# Patient Record
Sex: MALE | Race: WHITE | NOT HISPANIC OR LATINO | Employment: FULL TIME | URBAN - METROPOLITAN AREA
[De-identification: names, ages, dates, MRNs, and addresses within clinical notes are randomized per-mention and may not be internally consistent; named-entity substitution may affect disease eponyms.]

---

## 2017-02-27 ENCOUNTER — TELEPHONE (OUTPATIENT)
Dept: FAMILY MEDICINE | Facility: OTHER | Age: 52
End: 2017-02-27

## 2017-02-27 NOTE — TELEPHONE ENCOUNTER
Krishna Diaz is a 51 year old male who presents with sore throat and ear ache.    NURSING ASSESSMENT:  Description:  Sore throat and right ear ache. Night sweats last night and feeling fatigue. Around people at work who have been sick with colds. Stated pain in tolerable but worse when laying down trying to sleep. Denies fevers, chills, fever, body aches.   Onset/duration:  Yesterday, 02/26/2017  Pain scale (0-10)   2-6/10 sharp pain when swallowing   Last exam/Treatment:  06/22/2016  Allergies:   Allergies   Allergen Reactions     No Known Drug Allergies      NURSING PLAN: Nursing advice to patient to be seen within 24 hours    RECOMMENDED DISPOSITION:  See in 24 hours and home care measures - for the sore throat and ear pain   Will comply with recommendation: Yes schedule to see EM 02/28/2017 at 4pm   If further questions/concerns or if symptoms do not improve, worsen or new symptoms develop, call your PCP or [facility] as soon as possible.    NOTES:  Disposition was determined by the first positive assessment question, therefore all previous assessment questions were negative    Guideline used:  Telephone Triage Protocols for Nurses, Fourth Edition, Jill Jc  Nursing Judgment     Maritza Finn RN

## 2017-02-28 ENCOUNTER — OFFICE VISIT (OUTPATIENT)
Dept: FAMILY MEDICINE | Facility: OTHER | Age: 52
End: 2017-02-28
Payer: COMMERCIAL

## 2017-02-28 VITALS
DIASTOLIC BLOOD PRESSURE: 70 MMHG | HEART RATE: 59 BPM | WEIGHT: 255 LBS | RESPIRATION RATE: 18 BRPM | HEIGHT: 68 IN | SYSTOLIC BLOOD PRESSURE: 136 MMHG | TEMPERATURE: 98.8 F | BODY MASS INDEX: 38.65 KG/M2 | OXYGEN SATURATION: 94 %

## 2017-02-28 DIAGNOSIS — R07.0 THROAT PAIN: Primary | ICD-10-CM

## 2017-02-28 DIAGNOSIS — J06.9 VIRAL URI WITH COUGH: ICD-10-CM

## 2017-02-28 DIAGNOSIS — H10.45 CHRONIC ALLERGIC CONJUNCTIVITIS: ICD-10-CM

## 2017-02-28 LAB
DEPRECATED S PYO AG THROAT QL EIA: NORMAL
MICRO REPORT STATUS: NORMAL
SPECIMEN SOURCE: NORMAL

## 2017-02-28 PROCEDURE — 99213 OFFICE O/P EST LOW 20 MIN: CPT | Performed by: STUDENT IN AN ORGANIZED HEALTH CARE EDUCATION/TRAINING PROGRAM

## 2017-02-28 PROCEDURE — 87081 CULTURE SCREEN ONLY: CPT | Performed by: STUDENT IN AN ORGANIZED HEALTH CARE EDUCATION/TRAINING PROGRAM

## 2017-02-28 PROCEDURE — 87880 STREP A ASSAY W/OPTIC: CPT | Performed by: STUDENT IN AN ORGANIZED HEALTH CARE EDUCATION/TRAINING PROGRAM

## 2017-02-28 ASSESSMENT — PAIN SCALES - GENERAL: PAINLEVEL: MODERATE PAIN (5)

## 2017-02-28 NOTE — NURSING NOTE
"Chief Complaint   Patient presents with     Pharyngitis     Otalgia     Panel Management     anthony, height, flu shot, colonoscopy, phq9       Initial /70 (BP Location: Right arm, Patient Position: Chair, Cuff Size: Adult Large)  Pulse 59  Temp 98.8  F (37.1  C) (Temporal)  Resp 18  Ht 5' 8.31\" (1.735 m)  Wt 255 lb (115.7 kg)  SpO2 94%  BMI 38.42 kg/m2 Estimated body mass index is 38.42 kg/(m^2) as calculated from the following:    Height as of this encounter: 5' 8.31\" (1.735 m).    Weight as of this encounter: 255 lb (115.7 kg).  Medication Reconciliation: complete   Faviola Butts CMA        "

## 2017-02-28 NOTE — PROGRESS NOTES
"  SUBJECTIVE:                                                    Krishna Diaz is a 51 year old male who presents to clinic today for the following health issues:    HPI    Acute Illness   Acute illness concerns: Sore throat  Onset: 3 days    Fever: YES- Sunday    Chills/Sweats: no    Headache (location?): YES- temples    Sinus Pressure:no    Conjunctivitis:  Red, but pt states he also works with [employer]    Ear Pain: YES: right    Rhinorrhea: YES    Congestion: no    Sore Throat: YES     Cough: YES-non-productive    Wheeze: no    Decreased Appetite: YES    Nausea: no    Vomiting: no    Diarrhea:  YES    Dysuria/Freq.: no    Fatigue/Achiness: YES    Sick/Strep Exposure: no     Therapies Tried and outcome: Nyquil - does not help    Problem list and histories reviewed & adjusted, as indicated.  Additional history: as documented    Labs reviewed in EPIC    ROS:  Constitutional, HEENT, cardiovascular, pulmonary, gi and gu systems are negative, except as otherwise noted.    OBJECTIVE:                                                    /70 (BP Location: Right arm, Patient Position: Chair, Cuff Size: Adult Large)  Pulse 59  Temp 98.8  F (37.1  C) (Temporal)  Resp 18  Ht 5' 8.31\" (1.735 m)  Wt 255 lb (115.7 kg)  SpO2 94%  BMI 38.42 kg/m2  Body mass index is 38.42 kg/(m^2).  GENERAL: healthy, alert and no distress  EYES: Eyes grossly normal to inspection, PERRL, bilateral sclera injected, conjunctivae normal  HENT: Right TM dull, left TM normal bilateral ear canals normal, pharynx erythematous  NECK: no adenopathy, no asymmetry, masses, or scars   RESP: lungs clear to auscultation - no rales, rhonchi or wheezes  CV: regular rate and rhythm, normal S1 S2, no S3 or S4, no murmur, click or rub  MS: no gross musculoskeletal defects noted, no edema  SKIN: no suspicious lesions or rashes  PSYCH: mentation appears normal, affect normal/bright    Diagnostic Test Results:  Results for orders placed or performed in visit on " 02/28/17   Strep, Rapid Screen   Result Value Ref Range    Specimen Description Throat     Rapid Strep A Screen       NEGATIVE: No Group A streptococcal antigen detected by immunoassay, await   culture report.      Micro Report Status FINAL 02/28/2017         ASSESSMENT/PLAN:                                                      1. Viral URI with cough  Supportive measures encouraged, handout given.  RTC if symptoms persist or fail to improve.    2. Chronic allergic conjunctivitis  Patient works with [employer] and does report chronic red eyes.  No mattering or drainage.  Likely allergic conjunctivitis.      3. Throat pain  - Strep, Rapid Screen - negative  - Beta strep group A culture    Patient Instructions   Return to clinic in 7-10 days if symptoms persist or worsen.      1.  Stay hydrated and rest - Drink plenty of fluid, 8-10 glasses of water per day and get lots of rest.    2. Mucinex - you can try Mucinex (immediate release) 200-400 mg every 4 hours OR Mucinex (extended release) 600-1200 mg every 12 hours as needed for congestion.  Mucinex helps thin out your mucus so that it is easier to get it out of your body.    3.  Flonase - 1 spray in each nostril daily.  This is a steroid nasal spray that can help decrease inflammation in the nose and sinuses resulting in less nasal congestion.    4. Oral decongestants - you may take a nasal decongestant for up to 3 days as needed.  Sudafed (pseudoephedrine) can be used if you do not have high blood pressure.  The dosing for Sudafed is 60 mg every 4-6 hours as needed for immediate release.  For extended release it is 120 mg every 12 hours.  If you have high blood pressure you can try Coricidin HBP.    5. Nasal decongestant - Afrin nasal spray 2-3 sprays in each nostril twice daily for up to 3 days can help improve nasal congestion.  Do not use longer than 3 days as congestion can worsen with prolonged use.    6. Humidifier - use a humidifier in the places you spend the  most time.  This will help moisturize the air you breath in and help improve cough.    7. Headache, sore throat, fever - you can try ibuprofen (maximum of 2400 mg/24 hours) or Tylenol (maximum of 3,000 mg/24 hours).    - do not use Tylenol if you have liver disease  - do not use Ibuprofen if you have kidney disease or take a blood thinner.    8. Vicks VapoRub or Mentholatum - you may try rubbing this on your chest, under your nose and over the top of your nose for congestion.  Avoid contact with eyes, mouth and inside of nose.    9. Sore throat - gargle with warm salt water as needed.  Ibuprofen can help.  Chloraseptic throat lozenges or spray can help numb the throat.       If your symptoms worsen or do not improve after lasting 10 days, please return to the clinic.    Jaylene Rodriguez, NEGRITA  [phone]                Belinda Rodriguez, TISHA [facility]

## 2017-02-28 NOTE — PROGRESS NOTES
Discussed these results with patient in the clinic.    Belinda Rodriguez NP-C  [facility]

## 2017-02-28 NOTE — PATIENT INSTRUCTIONS
Return to clinic in 7-10 days if symptoms persist or worsen.      1.  Stay hydrated and rest - Drink plenty of fluid, 8-10 glasses of water per day and get lots of rest.    2. Mucinex - you can try Mucinex (immediate release) 200-400 mg every 4 hours OR Mucinex (extended release) 600-1200 mg every 12 hours as needed for congestion.  Mucinex helps thin out your mucus so that it is easier to get it out of your body.    3.  Flonase - 1 spray in each nostril daily.  This is a steroid nasal spray that can help decrease inflammation in the nose and sinuses resulting in less nasal congestion.    4. Oral decongestants - you may take a nasal decongestant for up to 3 days as needed.  Sudafed (pseudoephedrine) can be used if you do not have high blood pressure.  The dosing for Sudafed is 60 mg every 4-6 hours as needed for immediate release.  For extended release it is 120 mg every 12 hours.  If you have high blood pressure you can try Coricidin HBP.    5. Nasal decongestant - Afrin nasal spray 2-3 sprays in each nostril twice daily for up to 3 days can help improve nasal congestion.  Do not use longer than 3 days as congestion can worsen with prolonged use.    6. Humidifier - use a humidifier in the places you spend the most time.  This will help moisturize the air you breath in and help improve cough.    7. Headache, sore throat, fever - you can try ibuprofen (maximum of 2400 mg/24 hours) or Tylenol (maximum of 3,000 mg/24 hours).    - do not use Tylenol if you have liver disease  - do not use Ibuprofen if you have kidney disease or take a blood thinner.    8. Vicks VapoRub or Mentholatum - you may try rubbing this on your chest, under your nose and over the top of your nose for congestion.  Avoid contact with eyes, mouth and inside of nose.    9. Sore throat - gargle with warm salt water as needed.  Ibuprofen can help.  Chloraseptic throat lozenges or spray can help numb the throat.       If your symptoms worsen or do not  improve after lasting 10 days, please return to the clinic.    Jaylene Rodriguez, CNP  [phone]

## 2017-02-28 NOTE — MR AVS SNAPSHOT
After Visit Summary   2/28/2017    Krishna Diaz    MRN: [id number]           Patient Information     Date Of Birth          1965        Visit Information        Provider Department      2/28/2017 4:00 PM Belinda Rodriguez APRN [facility]        Today's Diagnoses     Throat pain    -  1      Care Instructions    Return to clinic in 7-10 days if symptoms persist or worsen.      1.  Stay hydrated and rest - Drink plenty of fluid, 8-10 glasses of water per day and get lots of rest.    2. Mucinex - you can try Mucinex (immediate release) 200-400 mg every 4 hours OR Mucinex (extended release) 600-1200 mg every 12 hours as needed for congestion.  Mucinex helps thin out your mucus so that it is easier to get it out of your body.    3.  Flonase - 1 spray in each nostril daily.  This is a steroid nasal spray that can help decrease inflammation in the nose and sinuses resulting in less nasal congestion.    4. Oral decongestants - you may take a nasal decongestant for up to 3 days as needed.  Sudafed (pseudoephedrine) can be used if you do not have high blood pressure.  The dosing for Sudafed is 60 mg every 4-6 hours as needed for immediate release.  For extended release it is 120 mg every 12 hours.  If you have high blood pressure you can try Coricidin HBP.    5. Nasal decongestant - Afrin nasal spray 2-3 sprays in each nostril twice daily for up to 3 days can help improve nasal congestion.  Do not use longer than 3 days as congestion can worsen with prolonged use.    6. Humidifier - use a humidifier in the places you spend the most time.  This will help moisturize the air you breath in and help improve cough.    7. Headache, sore throat, fever - you can try ibuprofen (maximum of 2400 mg/24 hours) or Tylenol (maximum of 3,000 mg/24 hours).    - do not use Tylenol if you have liver disease  - do not use Ibuprofen if you have kidney disease or take a blood thinner.    8. Vicks  "VapoRub or Mentholatum - you may try rubbing this on your chest, under your nose and over the top of your nose for congestion.  Avoid contact with eyes, mouth and inside of nose.    9. Sore throat - gargle with warm salt water as needed.  Ibuprofen can help.  Chloraseptic throat lozenges or spray can help numb the throat.       If your symptoms worsen or do not improve after lasting 10 days, please return to the clinic.    Jaylene Rodriguez CNP  [phone]                  Follow-ups after your visit        Who to contact     If you have questions or need follow up information about today's clinic visit or your schedule please contact [facility] directly at [phone].  Normal or non-critical lab and imaging results will be communicated to you by [employer], letter or phone within 4 business days after the clinic has received the results. If you do not hear from us within 7 days, please contact the clinic through [employer] or phone. If you have a critical or abnormal lab result, we will notify you by phone as soon as possible.  Submit refill requests through [employer] or call your pharmacy and they will forward the refill request to us. Please allow 3 business days for your refill to be completed.          Additional Information About Your Visit        [employer] Information     [employer] lets you send messages to your doctor, view your test results, renew your prescriptions, schedule appointments and more. To sign up, go to [email or web address] . Click on \"Log in\" on the left side of the screen, which will take you to the Welcome page. Then click on \"Sign up Now\" on the right side of the page.     You will be asked to enter the access code listed below, as well as some personal information. Please follow the directions to create your username and password.     Your access code is: [email or web address]  Expires: 2017  4:35 PM     Your access code will  in 90 days. If you need help or a new code, please call " "your [place] clinic or [phone].        Care EveryWhere ID     This is your Care EveryWhere ID. This could be used by other organizations to access your [place] medical records  [id number]        Your Vitals Were     Pulse Temperature Respirations Height Pulse Oximetry BMI (Body Mass Index)    59 98.8  F (37.1  C) (Temporal) 18 5' 8.31\" (1.735 m) 94% 38.42 kg/m2       Blood Pressure from Last 3 Encounters:   02/28/17 136/70   06/22/16 138/78   11/06/14 100/64    Weight from Last 3 Encounters:   02/28/17 255 lb (115.7 kg)   06/22/16 259 lb 4 oz (117.6 kg)   11/06/14 240 lb (108.9 kg)              We Performed the Following     Beta strep group A culture     Strep, Rapid Screen        Primary Care Provider Office Phone # Fax #    David Sheehan [phone]       [address] [place] [address]        Thank you!     Thank you for choosing [facility]  for your care. Our goal is always to provide you with excellent care. Hearing back from our patients is one way we can continue to improve our services. Please take a few minutes to complete the written survey that you may receive in the mail after your visit with us. Thank you!             Your Updated Medication List - Protect others around you: Learn how to safely use, store and throw away your medicines at www.disposemymeds.org.          This list is accurate as of: 2/28/17  4:35 PM.  Always use your most recent med list.                   Brand Name Dispense Instructions for use    ALLEGRA ALLERGY PO          flax seed oil 1000 MG capsule      Take 1 capsule by mouth daily Reported on 2/28/2017       Multi-vitamin Tabs tablet   Generic drug:  multivitamin, therapeutic with minerals      Take 1 tablet by mouth daily.       risperiDONE 1 MG tablet    risperDAL    180 tablet    Take 1 tablet (1 mg) by mouth 2 times daily as needed       zolpidem 10 MG tablet    AMBIEN    30 tablet    Take 1 " tablet (10 mg) by mouth nightly as needed for sleep

## 2017-03-02 LAB
BACTERIA SPEC CULT: NORMAL
MICRO REPORT STATUS: NORMAL
SPECIMEN SOURCE: NORMAL

## 2017-03-30 DIAGNOSIS — G47.00 INSOMNIA, UNSPECIFIED TYPE: ICD-10-CM

## 2017-03-30 NOTE — TELEPHONE ENCOUNTER
Ambien       Last Written Prescription Date: 6/22/2016  Last Fill Quantity: 30,  # refills: 5   Last Office Visit with [facility], [facility] or [facility] prescribing provider: 2/28/2017

## 2017-04-04 RX ORDER — ZOLPIDEM TARTRATE 10 MG/1
TABLET ORAL
Qty: 31 TABLET | Refills: 5 | Status: SHIPPED | OUTPATIENT
Start: 2017-04-04 | End: 2017-09-20

## 2017-07-17 ENCOUNTER — TELEPHONE (OUTPATIENT)
Dept: FAMILY MEDICINE | Facility: CLINIC | Age: 52
End: 2017-07-17

## 2017-07-17 DIAGNOSIS — F41.1 GENERALIZED ANXIETY DISORDER: ICD-10-CM

## 2017-07-17 NOTE — TELEPHONE ENCOUNTER
risperiDONE (RISPERDAL) 1 MG tablet     Last Written Prescription Date: 6/22/16  Last Fill Quantity: 180, # refills: 3  [facility] with [facility] or [facility] prescribing provider: 2/28/17       BP Readings from Last 3 Encounters:   02/28/17 136/70   06/22/16 138/78   11/06/14 100/64     Pulse Readings from Last 2 Encounters:   02/28/17 59   06/22/16 82     Lab Results   Component Value Date     06/22/2016     Lab Results   Component Value Date    WBC 7.1 06/29/2012     Lab Results   Component Value Date    RBC 4.98 06/29/2012     Lab Results   Component Value Date    HGB 15.6 06/29/2012     Lab Results   Component Value Date    HCT 44.7 06/29/2012     No components found for: MCT  Lab Results   Component Value Date    MCV 90 06/29/2012     Lab Results   Component Value Date    MCH 31.3 06/29/2012     Lab Results   Component Value Date    MCHC 34.9 06/29/2012     Lab Results   Component Value Date    RDW 12.4 06/29/2012     Lab Results   Component Value Date     06/29/2012     Lab Results   Component Value Date    CHOL 199 06/22/2016     Lab Results   Component Value Date    HDL 41 06/22/2016     Lab Results   Component Value Date     06/22/2016     Lab Results   Component Value Date    TRIG 283 06/22/2016     Lab Results   Component Value Date    CHOLHDLRATIO 3.6 02/14/2005

## 2017-07-18 ENCOUNTER — TELEPHONE (OUTPATIENT)
Dept: FAMILY MEDICINE | Facility: OTHER | Age: 52
End: 2017-07-18

## 2017-07-18 DIAGNOSIS — Z12.11 SPECIAL SCREENING FOR MALIGNANT NEOPLASMS, COLON: Primary | ICD-10-CM

## 2017-07-18 RX ORDER — RISPERIDONE 1 MG/1
TABLET ORAL
Qty: 180 TABLET | Refills: 0 | Status: SHIPPED | OUTPATIENT
Start: 2017-07-18 | End: 2017-09-20

## 2017-07-18 NOTE — TELEPHONE ENCOUNTER
Due for clinic follow up prior to further refills.     Electronically signed by:  David Sheehan M.D.  7/18/2017

## 2017-07-18 NOTE — TELEPHONE ENCOUNTER
Summary:    Patient is due/failing the following:   COLONOSCOPY and PHQ9    Action needed:   Patient needs to do PHQ9. and schedule a colonoscopy or complete a FIT test     Type of outreach:    Phone, left message for patient to call back.     Questions for provider review:    None                                                                                                                                    Tara Meadows       Chart routed to Care Team .        Panel Management Review      Patient has the following on his problem list:     Depression / Dysthymia review  PHQ-9 SCORE 9/4/2013 5/12/2014 6/22/2016   Total Score 11 12 -   Total Score - - 2      Patient is due for:  PHQ9      Composite cancer screening  Chart review shows that this patient is due/due soon for the following Colonoscopy

## 2017-07-18 NOTE — TELEPHONE ENCOUNTER
Routing refill request to provider for review/approval because:  Diagnosis not currently on refill protocol.     Jimena Marshall RN

## 2017-07-19 NOTE — TELEPHONE ENCOUNTER
Krishna returns call and states he would like to do the FIT test at this time and not the Colonoscopy.

## 2017-09-20 ENCOUNTER — OFFICE VISIT (OUTPATIENT)
Dept: FAMILY MEDICINE | Facility: CLINIC | Age: 52
End: 2017-09-20
Payer: COMMERCIAL

## 2017-09-20 VITALS
WEIGHT: 250 LBS | OXYGEN SATURATION: 96 % | RESPIRATION RATE: 12 BRPM | SYSTOLIC BLOOD PRESSURE: 136 MMHG | DIASTOLIC BLOOD PRESSURE: 82 MMHG | HEART RATE: 95 BPM | TEMPERATURE: 97.7 F | BODY MASS INDEX: 37.67 KG/M2

## 2017-09-20 DIAGNOSIS — G47.00 INSOMNIA, UNSPECIFIED TYPE: ICD-10-CM

## 2017-09-20 DIAGNOSIS — Z12.11 SPECIAL SCREENING FOR MALIGNANT NEOPLASMS, COLON: ICD-10-CM

## 2017-09-20 DIAGNOSIS — F32.1 MODERATE MAJOR DEPRESSION (H): ICD-10-CM

## 2017-09-20 DIAGNOSIS — F41.1 GENERALIZED ANXIETY DISORDER: ICD-10-CM

## 2017-09-20 PROCEDURE — 99214 OFFICE O/P EST MOD 30 MIN: CPT | Performed by: FAMILY MEDICINE

## 2017-09-20 RX ORDER — RISPERIDONE 1 MG/1
TABLET ORAL
Qty: 180 TABLET | Refills: 3 | Status: SHIPPED | OUTPATIENT
Start: 2017-09-20 | End: 2018-11-29

## 2017-09-20 RX ORDER — ZOLPIDEM TARTRATE 10 MG/1
TABLET ORAL
Qty: 31 TABLET | Refills: 5 | Status: SHIPPED | OUTPATIENT
Start: 2017-09-20 | End: 2018-05-31

## 2017-09-20 ASSESSMENT — ANXIETY QUESTIONNAIRES
1. FEELING NERVOUS, ANXIOUS, OR ON EDGE: NOT AT ALL
GAD7 TOTAL SCORE: 0
IF YOU CHECKED OFF ANY PROBLEMS ON THIS QUESTIONNAIRE, HOW DIFFICULT HAVE THESE PROBLEMS MADE IT FOR YOU TO DO YOUR WORK, TAKE CARE OF THINGS AT HOME, OR GET ALONG WITH OTHER PEOPLE: SOMEWHAT DIFFICULT
5. BEING SO RESTLESS THAT IT IS HARD TO SIT STILL: NOT AT ALL
2. NOT BEING ABLE TO STOP OR CONTROL WORRYING: NOT AT ALL
7. FEELING AFRAID AS IF SOMETHING AWFUL MIGHT HAPPEN: NOT AT ALL
6. BECOMING EASILY ANNOYED OR IRRITABLE: NOT AT ALL
3. WORRYING TOO MUCH ABOUT DIFFERENT THINGS: NOT AT ALL

## 2017-09-20 ASSESSMENT — PATIENT HEALTH QUESTIONNAIRE - PHQ9
5. POOR APPETITE OR OVEREATING: NOT AT ALL
SUM OF ALL RESPONSES TO PHQ QUESTIONS 1-9: 4

## 2017-09-20 ASSESSMENT — PAIN SCALES - GENERAL: PAINLEVEL: NO PAIN (0)

## 2017-09-20 NOTE — NURSING NOTE
"Chief Complaint   Patient presents with     Anxiety     recheck       Initial /82  Pulse 95  Temp 97.7  F (36.5  C) (Tympanic)  Resp 12  Wt 250 lb (113.4 kg)  SpO2 96%  BMI 37.67 kg/m2 Estimated body mass index is 37.67 kg/(m^2) as calculated from the following:    Height as of 2/28/17: 5' 8.31\" (1.735 m).    Weight as of this encounter: 250 lb (113.4 kg).  Medication Reconciliation: complete    "

## 2017-09-20 NOTE — LETTER
My Depression Action Plan  Name: Krishna Diaz   Date of Birth 1965  Date: 9/20/2017    My doctor: David Sheehan   My clinic: [address] [address] [phone]          GREEN    ZONE   Good Control    What it looks like:     Things are going generally well. You have normal up s and down s. You may even feel depressed from time to time, but bad moods usually last less than a day.   What you need to do:  1. Continue to care for yourself (see self care plan)  2. Check your depression survival kit and update it as needed  3. Follow your physician s recommendations including any medication.  4. Do not stop taking medication unless you consult with your physician first.           YELLOW         ZONE Getting Worse    What it looks like:     Depression is starting to interfere with your life.     It may be hard to get out of bed; you may be starting to isolate yourself from others.    Symptoms of depression are starting to last most all day and this has happened for several days.     You may have suicidal thoughts but they are not constant.   What you need to do:     1. Call your care team, your response to treatment will improve if you keep your care team informed of your progress. Yellow periods are signs an adjustment may need to be made.     2. Continue your self-care, even if you have to fake it!    3. Talk to someone in your support network    4. Open up your depression survival kit           RED    ZONE Medical Alert - Get Help    What it looks like:     Depression is seriously interfering with your life.     You may experience these or other symptoms: You can t get out of bed most days, can t work or engage in other necessary activities, you have trouble taking care of basic hygiene, or basic responsibilities, thoughts of suicide or death that will not go away, self-injurious behavior.     What you need to do:  1. Call your care team and  request a same-day appointment. If they are not available (weekends or after hours) call your local crisis line, emergency room or 911.      Electronically signed by: Saundra Diaz, September 20, 2017    Depression Self Care Plan / Survival Kit    Self-Care for Depression  Here s the deal. Your body and mind are really not as separate as most people think.  What you do and think affects how you feel and how you feel influences what you do and think. This means if you do things that people who feel good do, it will help you feel better.  Sometimes this is all it takes.  There is also a place for medication and therapy depending on how severe your depression is, so be sure to consult with your medical provider and/ or Behavioral Health Consultant if your symptoms are worsening or not improving.     In order to better manage my stress, I will:    Exercise  Get some form of exercise, every day. This will help reduce pain and release endorphins, the  feel good  chemicals in your brain. This is almost as good as taking antidepressants!  This is not the same as joining a gym and then never going! (they count on that by the way ) It can be as simple as just going for a walk or doing some gardening, anything that will get you moving.      Hygiene   Maintain good hygiene (Get out of bed in the morning, Make your bed, Brush your teeth, Take a shower, and Get dressed like you were going to work, even if you are unemployed).  If your clothes don't fit try to get ones that do.    Diet  I will strive to eat foods that are good for me, drink plenty of water, and avoid excessive sugar, caffeine, alcohol, and other mood-altering substances.  Some foods that are helpful in depression are: complex carbohydrates, B vitamins, flaxseed, fish or fish oil, fresh fruits and vegetables.    Psychotherapy  I agree to participate in Individual Therapy (if recommended).    Medication  If prescribed medications, I agree to take them.  Missing  doses can result in serious side effects.  I understand that drinking alcohol, or other illicit drug use, may cause potential side effects.  I will not stop my medication abruptly without first discussing it with my provider.    Staying Connected With Others  I will stay in touch with my friends, family members, and my primary care provider/team.    Use your imagination  Be creative.  We all have a creative side; it doesn t matter if it s oil painting, sand castles, or mud pies! This will also kick up the endorphins.    Witness Beauty  (AKA stop and smell the roses) Take a look outside, even in mid-winter. Notice colors, textures. Watch the squirrels and birds.     Service to others  Be of service to others.  There is always someone else in need.  By helping others we can  get out of ourselves  and remember the really important things.  This also provides opportunities for practicing all the other parts of the program.    Humor  Laugh and be silly!  Adjust your TV habits for less news and crime-drama and more comedy.    Control your stress  Try breathing deep, massage therapy, biofeedback, and meditation. Find time to relax each day.     My support system    Clinic Contact:  Phone number:    Contact 1:  Phone number:    Contact 2:  Phone number:    Protestant/:  Phone number:    Therapist:  Phone number:    Local crisis center:    Phone number:    Other community support:  Phone number:

## 2017-09-20 NOTE — PROGRESS NOTES
SUBJECTIVE:   Krishna Diaz is a 52 year old male who presents to clinic today for the following health issues:      Anxiety Follow-Up    Status since last visit: No change    Other associated symptoms:None    Complicating factors:   Significant life event: No   Current substance abuse: None  Depression symptoms: No  JADA-7 SCORE 9/20/2017   Total Score 0       GAD7      Amount of exercise or physical activity: None    Problems taking medications regularly: No    Medication side effects: none  Diet: regular (no restrictions)      PROBLEMS TO ADD ON...    His biggest issue is poor sleep.  He on average gets 4 hours per night.  He does use the ambien on the weekends but is afraid to use them during the work week for fear of oversleeping.  He sleeps in on the weekends so easily sleeps until 9-10 am when he uses the 10 mg ambien tablet.      He is also overdue for a colonoscopy and we discussed other options including yearly FIT testing.  He is interested in doing this instead of a colonoscopy.  He is aware that if it comes back + that we would recommend a colonoscopy at that time.      Problem list and histories reviewed & adjusted, as indicated.  Additional history: as documented    Patient Active Problem List   Diagnosis     Obesity     Other specific developmental learning difficulties     Restless leg     HYPERLIPIDEMIA LDL GOAL <130     ADHD (attention deficit hyperactivity disorder)     Moderate major depression (H)     Suicidal ideation     Generalized anxiety disorder     Anxiety     Right inguinal hernia     Right testicular pain     Past Surgical History:   Procedure Laterality Date     HC COLONOSCOPY W BIOPSY  3/8/2006     REMOVAL OF SPERM DUCT(S)         Social History   Substance Use Topics     Smoking status: Never Smoker     Smokeless tobacco: Never Used     Alcohol use No     Family History   Problem Relation Age of Onset     Depression Mother      suicide attempts x 2     CANCER Mother      brain  tumor     Psychotic Disorder Son      ADD     Psychotic Disorder Daughter      ADD         Current Outpatient Prescriptions   Medication Sig Dispense Refill     risperiDONE (RISPERDAL) 1 MG tablet TAKE ONE TABLET(1MG) BY MOUTH TWICE DAILY AS NEEDED 180 tablet 3     zolpidem (AMBIEN) 10 MG tablet TAKE ONE TABLET BY MOUTH ONCE DAILY IN THE EVENING AS NEEDED FOR SLEEP 31 tablet 5     Fexofenadine HCl (ALLEGRA ALLERGY PO)        Multiple Vitamin (MULTI-VITAMIN) per tablet Take 1 tablet by mouth daily.         [DISCONTINUED] risperiDONE (RISPERDAL) 1 MG tablet TAKE ONE TABLET(1MG) BY MOUTH TWICE DAILY AS NEEDED 180 tablet 0     Allergies   Allergen Reactions     No Known Drug Allergies      Recent Labs   Lab Test  06/22/16   1521  06/29/12   1605   LDL  101*   --    HDL  41   --    TRIG  283*   --    ALT   --   34   CR   --   0.73   GFRESTIMATED   --   >90   GFRESTBLACK   --   >90   POTASSIUM   --   4.4   TSH   --   1.56      BP Readings from Last 3 Encounters:   09/20/17 136/82   02/28/17 136/70   06/22/16 138/78    Wt Readings from Last 3 Encounters:   09/20/17 250 lb (113.4 kg)   02/28/17 255 lb (115.7 kg)   06/22/16 259 lb 4 oz (117.6 kg)         Reviewed and updated as needed this visit by clinical staffTobacco  Allergies  Meds  Problems       Reviewed and updated as needed this visit by Provider         ROS:  Constitutional, HEENT, cardiovascular, pulmonary, gi and gu systems are negative, except as otherwise noted.      OBJECTIVE:   /82  Pulse 95  Temp 97.7  F (36.5  C) (Tympanic)  Resp 12  Wt 250 lb (113.4 kg)  SpO2 96%  BMI 37.67 kg/m2  Body mass index is 37.67 kg/(m^2).  GENERAL: healthy, alert and no distress  NECK: no adenopathy, no asymmetry, masses, or scars and thyroid normal to palpation  RESP: lungs clear to auscultation - no rales, rhonchi or wheezes  CV: regular rate and rhythm, normal S1 S2, no S3 or S4, no murmur, click or rub, no peripheral edema and peripheral pulses strong  MS: no  "gross musculoskeletal defects noted, no edema    Diagnostic Test Results:  none     ASSESSMENT/PLAN:   (F41.1) Generalized anxiety disorder  (F32.1) Moderate major depression (H)  Comment: very stable his scores are putting him in the remission state.   Plan: risperiDONE (RISPERDAL) 1 MG tablet, DEPRESSION        ACTION PLAN (DAP)        Refill of his medication sent for the year.  No other changes.      (G47.00) Insomnia, unspecified type  Comment: we discussed using 1/2 dose or 5 mg for him during the week to see if he would sleep better but then have less issues with getting up in the am.   Plan: zolpidem (AMBIEN) 10 MG tablet        He will try this on a Friday night and see what he thinks.      (Z12.11) Special screening for malignant neoplasms, colon  Comment: he is agreeable to do the FIT testing.   Plan: Fecal colorectal cancer screen (FIT)        Order placed, he will drop it off at the [facility] lab.     BMI:   Estimated body mass index is 37.67 kg/(m^2) as calculated from the following:    Height as of 2/28/17: 5' 8.31\" (1.735 m).    Weight as of this encounter: 250 lb (113.4 kg).   Weight management plan: Discussed healthy diet and exercise guidelines and patient will follow up in 12 months in clinic to re-evaluate.    FUTURE APPOINTMENTS:       - Follow-up visit in 1 yr    Electronically signed by:  David Sheehan M.D.  9/20/2017      "

## 2017-09-20 NOTE — MR AVS SNAPSHOT
"              After Visit Summary   2017    Krishna Diaz    MRN: [id number]           Patient Information     Date Of Birth          1965        Visit Information        Provider Department      2017 3:40 PM David Sheehan MD [facility]        Today's Diagnoses     Generalized anxiety disorder        Insomnia, unspecified type           Follow-ups after your visit        Who to contact     If you have questions or need follow up information about today's clinic visit or your schedule please contact [facility] directly at [phone].  Normal or non-critical lab and imaging results will be communicated to you by [employer], letter or phone within 4 business days after the clinic has received the results. If you do not hear from us within 7 days, please contact the clinic through [employer] or phone. If you have a critical or abnormal lab result, we will notify you by phone as soon as possible.  Submit refill requests through [employer] or call your pharmacy and they will forward the refill request to us. Please allow 3 business days for your refill to be completed.          Additional Information About Your Visit        MyChart Information     [employer] lets you send messages to your doctor, view your test results, renew your prescriptions, schedule appointments and more. To sign up, go to [email or web address] . Click on \"Log in\" on the left side of the screen, which will take you to the Welcome page. Then click on \"Sign up Now\" on the right side of the page.     You will be asked to enter the access code listed below, as well as some personal information. Please follow the directions to create your username and password.     Your access code is: [email or web address]  Expires: 2017  3:59 PM     Your access code will  in 90 days. If you need help or a new code, please call your [facility] or [phone].        Care EveryWhere ID     This is your Care EveryWhere " ID. This could be used by other organizations to access your [facility] medical records  [id number]        Your Vitals Were     Pulse Temperature Respirations Pulse Oximetry BMI (Body Mass Index)       95 97.7  F (36.5  C) (Tympanic) 12 96% 37.67 kg/m2        Blood Pressure from Last 3 Encounters:   09/20/17 136/82   02/28/17 136/70   06/22/16 138/78    Weight from Last 3 Encounters:   09/20/17 250 lb (113.4 kg)   02/28/17 255 lb (115.7 kg)   06/22/16 259 lb 4 oz (117.6 kg)              We Performed the Following     DEPRESSION ACTION PLAN (DAP)          Today's Medication Changes          These changes are accurate as of: 9/20/17  3:59 PM.  If you have any questions, ask your nurse or doctor.               These medicines have changed or have updated prescriptions.        Dose/Directions    risperiDONE 1 MG tablet   Commonly known as:  risperDAL   This may have changed:  See the new instructions.   Used for:  Generalized anxiety disorder   Changed by:  David Sheehan MD        TAKE ONE TABLET(1MG) BY MOUTH TWICE DAILY AS NEEDED   Quantity:  180 tablet   Refills:  3       zolpidem 10 MG tablet   Commonly known as:  AMBIEN   This may have changed:  See the new instructions.   Used for:  Insomnia, unspecified type   Changed by:  David Sheehan MD        TAKE ONE TABLET BY MOUTH ONCE DAILY IN THE EVENING AS NEEDED FOR SLEEP   Quantity:  31 tablet   Refills:  5            Where to get your medicines      These medications were sent to [facility] [address] [place] [address] [facility]  [address] [facility] [address]     Phone:  [phone]     risperiDONE 1 MG tablet         Some of these will need a paper prescription and others can be bought over the counter.  Ask your nurse if you have questions.     Bring a paper prescription for each of these medications     zolpidem 10 MG tablet                Primary Care Provider Office Phone # Fax #    David Sheehan [phone]        [address] [place] [address]        Equal Access to Services     JAMES HUTTON : Hadii aad ku haddavidascencion Sobello, watomda fred, qajuan ramonkendall chavezteresanikole zaragoza, carol tongkatherynmelissa alicea. So [facility] [phone].    ATENCIÓN: Si habla español, tiene a monzon disposición servicios gratuitos de asistencia lingüística. Llame al [phone].    We comply with applicable federal civil rights laws and [place] laws. We do not discriminate on the basis of race, color, national origin, age, disability sex, sexual orientation or gender identity.            Thank you!     Thank you for choosing [facility]  for your care. Our goal is always to provide you with excellent care. Hearing back from our patients is one way we can continue to improve our services. Please take a few minutes to complete the written survey that you may receive in the mail after your visit with us. Thank you!             Your Updated Medication List - Protect others around you: Learn how to safely use, store and throw away your medicines at www.disposemymeds.org.          This list is accurate as of: 9/20/17  3:59 PM.  Always use your most recent med list.                   Brand Name Dispense Instructions for use Diagnosis    ALLEGRA ALLERGY PO           Multi-vitamin Tabs tablet   Generic drug:  multivitamin, therapeutic with minerals      Take 1 tablet by mouth daily.        risperiDONE 1 MG tablet    risperDAL    180 tablet    TAKE ONE TABLET(1MG) BY MOUTH TWICE DAILY AS NEEDED    Generalized anxiety disorder       zolpidem 10 MG tablet    AMBIEN    31 tablet    TAKE ONE TABLET BY MOUTH ONCE DAILY IN THE EVENING AS NEEDED FOR SLEEP    Insomnia, unspecified type

## 2017-09-21 ASSESSMENT — ANXIETY QUESTIONNAIRES: GAD7 TOTAL SCORE: 0

## 2017-11-17 ENCOUNTER — TELEPHONE (OUTPATIENT)
Dept: FAMILY MEDICINE | Facility: CLINIC | Age: 52
End: 2017-11-17

## 2017-11-17 NOTE — TELEPHONE ENCOUNTER
Krishna returned call. He states he does have a FIT test at home and will get that taken care of. He will call another time to set up any lab work.     Thank you. You Anguiano, Patient Representative

## 2017-11-17 NOTE — TELEPHONE ENCOUNTER
Panel Management Review      Patient has the following on his problem list:     Depression / Dysthymia review    Measure:  Needs PHQ-9 score of 4 or less during index window.  Administer PHQ-9 and if score is 5 or more, send encounter to provider for next steps.    5 - 7 month window range:     PHQ-9 SCORE 5/12/2014 6/22/2016 9/20/2017   Total Score 12 - -   Total Score - 2 4       If PHQ-9 recheck is 5 or more, route to provider for next steps.    Patient is due for:  None        Composite cancer screening  Chart review shows that this patient is due/due soon for the following Fecal Colorectal (FIT)  Summary:    Patient is due/failing the following:   FIT    Action needed:   Patient needs fasting lab only appointment    Type of outreach:    Phone, left message for patient to call back.     Questions for provider review:    None                                                                                                                                    MP/MA     Chart routed to Care Team .

## 2018-03-23 PROCEDURE — 82274 ASSAY TEST FOR BLOOD FECAL: CPT | Performed by: FAMILY MEDICINE

## 2018-03-25 DIAGNOSIS — Z12.11 SPECIAL SCREENING FOR MALIGNANT NEOPLASMS, COLON: ICD-10-CM

## 2018-03-25 LAB — HEMOCCULT STL QL IA: NEGATIVE

## 2018-03-26 ENCOUNTER — TELEPHONE (OUTPATIENT)
Dept: FAMILY MEDICINE | Facility: CLINIC | Age: 53
End: 2018-03-26

## 2018-03-26 NOTE — TELEPHONE ENCOUNTER
** Patient Call Attempt With Normal Lab or Test Results**    I have attempted to contact this patient by phone with the following results: left message to return my call on answering machine.    When patient returns call, please advise of the following result.  If patient has further questions or concerns route call back to team, thank you.    Please call patient with following results.  Let Reyes, (he goes by Reyes not Nikita), know that his fit test was negative.  Repeat in 1 year.    Electronically signed by:  David Sheehan M.D.  3/26/2018    Leny Flowers CMA (AAMA)

## 2018-05-31 DIAGNOSIS — G47.00 INSOMNIA, UNSPECIFIED TYPE: ICD-10-CM

## 2018-06-01 RX ORDER — ZOLPIDEM TARTRATE 10 MG/1
TABLET ORAL
Qty: 30 TABLET | Refills: 5 | Status: SHIPPED | OUTPATIENT
Start: 2018-06-01 | End: 2019-01-29

## 2018-11-29 ENCOUNTER — TELEPHONE (OUTPATIENT)
Dept: FAMILY MEDICINE | Facility: CLINIC | Age: 53
End: 2018-11-29

## 2018-11-29 DIAGNOSIS — F41.1 GENERALIZED ANXIETY DISORDER: ICD-10-CM

## 2018-11-30 NOTE — TELEPHONE ENCOUNTER
Routing to PCP for refill if appropriate.   Routing to schedulers for appointment with PCP.     Risperidone  Last Written Prescription Date:  09/20/2017  Last Fill Quantity: 180,  # refills: 3   Last office visit: 9/20/2017 with prescribing provider:  Aguila   Future Office Visit:  None  Routing refill request to provider for review/approval because:  Labs not current:  Lipids, CBC, Glucose.   Blood pressures are not current.   Patient needs to be seen because it has been more than 1 year since last office visit.    Requested Prescriptions   Pending Prescriptions Disp Refills     risperiDONE (RISPERDAL) 1 MG tablet [Pharmacy Med Name: RisperiDONE 1MG     TAB] 60 tablet 11     Sig: TAKE ONE TABLET BY MOUTH TWICE DAILY AS NEEDED    Antipsychotic Medications Failed    11/29/2018  3:38 PM       Failed - Blood pressure under 140/90 in past 12 months    BP Readings from Last 3 Encounters:   09/20/17 136/82   02/28/17 136/70   06/22/16 138/78          Failed - Lipid panel on file within the past 12 months    Recent Labs   Lab Test  06/22/16   1521   CHOL  199   TRIG  283*   HDL  41   LDL  101*   NHDL  158*          Failed - CBC on file in past 12 months    Recent Labs   Lab Test  06/29/12   1605   WBC  7.1   RBC  4.98   HGB  15.6   HCT  44.7   PLT  271          Failed - Heart Rate on file within past 12 months    Pulse Readings from Last 3 Encounters:   09/20/17 95   02/28/17 59   06/22/16 82          Failed - A1c or Glucose on file in past 12 months    Recent Labs   Lab Test  06/22/16   1521   GLC  101*     Please review patients last 3 weights. If a weight gain of >10 lbs exists, you may refill the prescription once after instructing the patient to schedule an appointment within the next 30 days.    Wt Readings from Last 3 Encounters:   09/20/17 250 lb (113.4 kg)   02/28/17 255 lb (115.7 kg)   06/22/16 259 lb 4 oz (117.6 kg)          Failed - Recent (6 mo) or future (30 days) visit within the authorizing provider's  specialty           Passed - Patient is 12 years of age or older      Jimena Marshall RN

## 2018-12-05 RX ORDER — RISPERIDONE 1 MG/1
TABLET ORAL
Qty: 90 TABLET | Refills: 0 | Status: SHIPPED | OUTPATIENT
Start: 2018-12-05 | End: 2019-01-29

## 2018-12-05 NOTE — TELEPHONE ENCOUNTER
He needs a follow-up in the office in order to receive further refills.    Electronically signed by:  David Sheehan M.D.  12/5/2018

## 2019-01-29 ENCOUNTER — OFFICE VISIT (OUTPATIENT)
Dept: FAMILY MEDICINE | Facility: CLINIC | Age: 54
End: 2019-01-29
Payer: COMMERCIAL

## 2019-01-29 ENCOUNTER — TELEPHONE (OUTPATIENT)
Dept: FAMILY MEDICINE | Facility: CLINIC | Age: 54
End: 2019-01-29

## 2019-01-29 VITALS
DIASTOLIC BLOOD PRESSURE: 82 MMHG | BODY MASS INDEX: 38.55 KG/M2 | WEIGHT: 254.4 LBS | RESPIRATION RATE: 16 BRPM | OXYGEN SATURATION: 94 % | TEMPERATURE: 97.6 F | SYSTOLIC BLOOD PRESSURE: 132 MMHG | HEART RATE: 63 BPM | HEIGHT: 68 IN

## 2019-01-29 DIAGNOSIS — E66.812 CLASS 2 OBESITY DUE TO EXCESS CALORIES WITHOUT SERIOUS COMORBIDITY WITH BODY MASS INDEX (BMI) OF 38.0 TO 38.9 IN ADULT: ICD-10-CM

## 2019-01-29 DIAGNOSIS — G47.00 INSOMNIA, UNSPECIFIED TYPE: ICD-10-CM

## 2019-01-29 DIAGNOSIS — Z00.00 ROUTINE GENERAL MEDICAL EXAMINATION AT A HEALTH CARE FACILITY: Primary | ICD-10-CM

## 2019-01-29 DIAGNOSIS — E66.09 CLASS 2 OBESITY DUE TO EXCESS CALORIES WITHOUT SERIOUS COMORBIDITY WITH BODY MASS INDEX (BMI) OF 38.0 TO 38.9 IN ADULT: ICD-10-CM

## 2019-01-29 DIAGNOSIS — Z23 NEED FOR VACCINATION: ICD-10-CM

## 2019-01-29 DIAGNOSIS — F20.89 OTHER SCHIZOPHRENIA (H): ICD-10-CM

## 2019-01-29 DIAGNOSIS — Z12.11 SPECIAL SCREENING FOR MALIGNANT NEOPLASMS, COLON: ICD-10-CM

## 2019-01-29 DIAGNOSIS — H61.23 BILATERAL IMPACTED CERUMEN: ICD-10-CM

## 2019-01-29 DIAGNOSIS — Z13.1 SCREENING FOR DIABETES MELLITUS: ICD-10-CM

## 2019-01-29 PROBLEM — E66.01 MORBID OBESITY (H): Status: RESOLVED | Noted: 2019-01-29 | Resolved: 2019-01-29

## 2019-01-29 PROBLEM — E66.01 MORBID OBESITY (H): Status: ACTIVE | Noted: 2019-01-29

## 2019-01-29 LAB
ANION GAP SERPL CALCULATED.3IONS-SCNC: 6 MMOL/L (ref 3–14)
BUN SERPL-MCNC: 13 MG/DL (ref 7–30)
CALCIUM SERPL-MCNC: 8.6 MG/DL (ref 8.5–10.1)
CHLORIDE SERPL-SCNC: 106 MMOL/L (ref 94–109)
CO2 SERPL-SCNC: 30 MMOL/L (ref 20–32)
CREAT SERPL-MCNC: 0.83 MG/DL (ref 0.66–1.25)
GFR SERPL CREATININE-BSD FRML MDRD: >90 ML/MIN/{1.73_M2}
GLUCOSE SERPL-MCNC: 86 MG/DL (ref 70–99)
POTASSIUM SERPL-SCNC: 4 MMOL/L (ref 3.4–5.3)
SODIUM SERPL-SCNC: 142 MMOL/L (ref 133–144)
TSH SERPL DL<=0.005 MIU/L-ACNC: 2.45 MU/L (ref 0.4–4)

## 2019-01-29 PROCEDURE — 90750 HZV VACC RECOMBINANT IM: CPT | Performed by: FAMILY MEDICINE

## 2019-01-29 PROCEDURE — 69209 REMOVE IMPACTED EAR WAX UNI: CPT | Mod: 50 | Performed by: FAMILY MEDICINE

## 2019-01-29 PROCEDURE — 90471 IMMUNIZATION ADMIN: CPT | Performed by: FAMILY MEDICINE

## 2019-01-29 PROCEDURE — 36415 COLL VENOUS BLD VENIPUNCTURE: CPT | Performed by: FAMILY MEDICINE

## 2019-01-29 PROCEDURE — 90714 TD VACC NO PRESV 7 YRS+ IM: CPT | Performed by: FAMILY MEDICINE

## 2019-01-29 PROCEDURE — 99396 PREV VISIT EST AGE 40-64: CPT | Mod: 25 | Performed by: FAMILY MEDICINE

## 2019-01-29 PROCEDURE — 80048 BASIC METABOLIC PNL TOTAL CA: CPT | Performed by: FAMILY MEDICINE

## 2019-01-29 PROCEDURE — 99213 OFFICE O/P EST LOW 20 MIN: CPT | Mod: 25 | Performed by: FAMILY MEDICINE

## 2019-01-29 PROCEDURE — 84443 ASSAY THYROID STIM HORMONE: CPT | Performed by: FAMILY MEDICINE

## 2019-01-29 PROCEDURE — 90472 IMMUNIZATION ADMIN EACH ADD: CPT | Performed by: FAMILY MEDICINE

## 2019-01-29 RX ORDER — ZOLPIDEM TARTRATE 5 MG/1
5-10 TABLET ORAL
Qty: 60 TABLET | Refills: 5 | Status: SHIPPED | OUTPATIENT
Start: 2019-01-29 | End: 2020-04-17

## 2019-01-29 RX ORDER — RISPERIDONE 1 MG/1
TABLET ORAL
Qty: 180 TABLET | Refills: 3 | Status: SHIPPED | OUTPATIENT
Start: 2019-01-29 | End: 2020-04-09

## 2019-01-29 ASSESSMENT — ENCOUNTER SYMPTOMS
HEMATURIA: 0
SHORTNESS OF BREATH: 0
CONSTIPATION: 0
DIARRHEA: 0
ARTHRALGIAS: 0
DYSURIA: 0
SORE THROAT: 0
CHILLS: 0
PALPITATIONS: 0
DIZZINESS: 0
HEMATOCHEZIA: 0
JOINT SWELLING: 0
HEARTBURN: 0
HEADACHES: 0
COUGH: 0
ABDOMINAL PAIN: 0
PARESTHESIAS: 0
NERVOUS/ANXIOUS: 0
MYALGIAS: 0
WEAKNESS: 0
FEVER: 0
NAUSEA: 0
EYE PAIN: 0
FREQUENCY: 0

## 2019-01-29 ASSESSMENT — PATIENT HEALTH QUESTIONNAIRE - PHQ9: SUM OF ALL RESPONSES TO PHQ QUESTIONS 1-9: 10

## 2019-01-29 ASSESSMENT — MIFFLIN-ST. JEOR: SCORE: 1968.45

## 2019-01-29 ASSESSMENT — PAIN SCALES - GENERAL: PAINLEVEL: NO PAIN (0)

## 2019-01-29 NOTE — NURSING NOTE
Screening Questionnaire for Adult Immunization    Are you sick today?   No   Do you have allergies to medications, food, a vaccine component or latex?   No   Have you ever had a serious reaction after receiving a vaccination?   No   Do you have a long-term health problem with heart disease, lung disease, asthma, kidney disease, metabolic disease (e.g. diabetes), anemia, or other blood disorder?   No   Do you have cancer, leukemia, HIV/AIDS, or any other immune system problem?   No   In the past 3 months, have you taken medications that affect  your immune system, such as prednisone, other steroids, or anticancer drugs; drugs for the treatment of rheumatoid arthritis, Crohn s disease, or psoriasis; or have you had radiation treatments?   No   Have you had a seizure, or a brain or other nervous system problem?   No   During the past year, have you received a transfusion of blood or blood     products, or been given immune (gamma) globulin or antiviral drug?   No   For women: Are you pregnant or is there a chance you could become        pregnant during the next month?   No   Have you received any vaccinations in the past 4 weeks?   No     Immunization questionnaire answers were all negative.         Patient instructed to remain in clinic for 15 minutes afterwards, and to report any adverse reaction to me immediately.       Screening performed by Saundra Diaz on 1/29/2019 at 3:13 PM    .

## 2019-01-29 NOTE — TELEPHONE ENCOUNTER
----- Message from David Sheehan MD sent at 1/29/2019  5:16 PM CST -----  Please call patient with following results.  Let the patient know that his labs are normal.    Electronically signed by:  David Sheehan M.D.  1/29/2019

## 2019-01-29 NOTE — PROGRESS NOTES
It  SUBJECTIVE:   CC: Krishna Diaz is an 53 year old male who presents for preventative health visit.     Physical   Annual:     Getting at least 3 servings of Calcium per day:  NO    Bi-annual eye exam:  NO    Dental care twice a year:  NO    Sleep apnea or symptoms of sleep apnea:  Daytime drowsiness    Diet:  Regular (no restrictions)    Frequency of exercise:  None    Taking medications regularly:  Yes    Medication side effects:  None    Additional concerns today:  Yes    PHQ-2 Total Score: 0    PROBLEMS TO ADD ON...  Some daytime drowsiness but he states he is only getting about 4-5 hours of sleep at night since he is staying up too late.  He typically wants to be in bed by about 930 or 10 but is up until at least 11/11/1930 sometimes midnight and this up by 6 AM for work.  He does not always take his Ambien during the week because he is afraid he will sleep 2 nights and misses alarm.  He typically takes it right before he goes to bed, and mostly on the weekends because of this.  Sleeping through the line.    Next concern is that he is been seeing shadows he does have a diagnosis of schizophrenia is only taking his Risperdal once a day instead of twice a day.  He is afraid that he will forget to take it during the day so he has not been taking in the morning.  Again we discussed taking it right before he leaves for work and then taking it 10-12 hours later when he gets home from work.    Today's PHQ-2 Score:   PHQ-2 ( 1999 [employer]) 1/29/2019   Q1: Little interest or pleasure in doing things 0   Q2: Feeling down, depressed or hopeless 0   PHQ-2 Score 0   Q1: Little interest or pleasure in doing things Not at all   Q2: Feeling down, depressed or hopeless Not at all   PHQ-2 Score 0     PHQ-9 SCORE 5/12/2014 6/22/2016 9/20/2017   PHQ-9 Total Score 12 - -   PHQ-9 Total Score - 2 4       Abuse: Current or Past(Physical, Sexual or Emotional)- No  Do you feel safe in your environment? Yes    Social History      Tobacco Use     Smoking status: Never Smoker     Smokeless tobacco: Never Used   Substance Use Topics     Alcohol use: No     Alcohol Use 1/29/2019   If you drink alcohol do you typically have greater than 3 drinks per day OR greater than 7 drinks per week? No   No flowsheet data found.    Last PSA: No results found for: PSA    Reviewed orders with patient. Reviewed health maintenance and updated orders accordingly - Yes  Labs reviewed in EPIC  BP Readings from Last 3 Encounters:   01/29/19 132/82   09/20/17 136/82   02/28/17 136/70    Wt Readings from Last 3 Encounters:   01/29/19 115.4 kg (254 lb 6.4 oz)   09/20/17 113.4 kg (250 lb)   02/28/17 115.7 kg (255 lb)                  Patient Active Problem List   Diagnosis     Obesity     Other specific developmental learning difficulties     Restless leg     HYPERLIPIDEMIA LDL GOAL <130     ADHD (attention deficit hyperactivity disorder)     Moderate major depression (H)     Suicidal ideation     Generalized anxiety disorder     Anxiety     Right inguinal hernia     Right testicular pain     Past Surgical History:   Procedure Laterality Date     HC COLONOSCOPY W BIOPSY  3/8/2006     REMOVAL OF SPERM DUCT(S)         Social History     Tobacco Use     Smoking status: Never Smoker     Smokeless tobacco: Never Used   Substance Use Topics     Alcohol use: No     Family History   Problem Relation Age of Onset     Depression Mother         suicide attempts x 2     Cancer Mother         brain tumor     Psychotic Disorder Son         ADD     Psychotic Disorder Daughter         ADD         Current Outpatient Medications   Medication Sig Dispense Refill     Fexofenadine HCl (ALLEGRA ALLERGY PO)        Multiple Vitamin (MULTI-VITAMIN) per tablet Take 1 tablet by mouth daily.         risperiDONE (RISPERDAL) 1 MG tablet TAKE ONE TABLET BY MOUTH TWICE DAILY AS NEEDED 90 tablet 0     zolpidem (AMBIEN) 10 MG tablet TAKE ONE TABLET BY MOUTH AT BEDTIME AS NEEDED FOR SLEEP 30 tablet 5      Allergies   Allergen Reactions     No Known Drug Allergies      Recent Labs   Lab Test 06/22/16  1521 06/29/12  1605   *  --    HDL 41  --    TRIG 283*  --    ALT  --  34   CR  --  0.73   GFRESTIMATED  --  >90   GFRESTBLACK  --  >90   POTASSIUM  --  4.4   TSH  --  1.56        Reviewed and updated as needed this visit by clinical staff  Tobacco  Allergies  Meds  Med Hx  Surg Hx  Fam Hx  Soc Hx        Reviewed and updated as needed this visit by Provider        Past Medical History:   Diagnosis Date     ADD (attention deficit disorder)      Mixed hyperlipidemia 6/25/2004     OBESITY NOS 9/3/2004     OTHER LEARNING DIFFICULTY 9/3/2004     Restless leg 3/5/2009     Severe Depression [296.33] 5/17/2009      Past Surgical History:   Procedure Laterality Date     HC COLONOSCOPY W BIOPSY  3/8/2006     REMOVAL OF SPERM DUCT(S)         Review of Systems   Constitutional: Negative for chills and fever.   HENT: Positive for hearing loss. Negative for congestion, ear pain and sore throat.    Eyes: Negative for pain and visual disturbance.   Respiratory: Negative for cough and shortness of breath.    Cardiovascular: Negative for chest pain, palpitations and peripheral edema.   Gastrointestinal: Negative for abdominal pain, constipation, diarrhea, heartburn, hematochezia and nausea.   Genitourinary: Negative for discharge, dysuria, frequency, genital sores, hematuria, impotence and urgency.   Musculoskeletal: Negative for arthralgias, joint swelling and myalgias.   Skin: Negative for rash.   Neurological: Negative for dizziness, weakness, headaches and paresthesias.   Psychiatric/Behavioral: Negative for mood changes. The patient is not nervous/anxious.      CONSTITUTIONAL: NEGATIVE for fever, chills, change in weight  INTEGUMENTARY/SKIN: NEGATIVE for worrisome rashes, moles or lesions  EYES: NEGATIVE for vision changes or irritation  ENT: NEGATIVE for ear, mouth and throat problems  RESP: NEGATIVE for significant  "cough or SOB  CV: NEGATIVE for chest pain, palpitations or peripheral edema  GI: NEGATIVE for nausea, abdominal pain, heartburn, or change in bowel habits   male: negative for dysuria, hematuria, decreased urinary stream, erectile dysfunction, urethral discharge  MUSCULOSKELETAL: NEGATIVE for significant arthralgias or myalgia  NEURO: NEGATIVE for weakness, dizziness or paresthesias  PSYCHIATRIC: NEGATIVE for changes in mood or affect    OBJECTIVE:   /82 (BP Location: Left arm, Patient Position: Sitting, Cuff Size: Adult Large)   Pulse 63   Temp 97.6  F (36.4  C) (Temporal)   Resp 16   Ht 1.727 m (5' 8\")   Wt 115.4 kg (254 lb 6.4 oz)   SpO2 94%   BMI 38.68 kg/m      Physical Exam  GENERAL: healthy, alert and no distress  EYES: Eyes grossly normal to inspection, PERRL and conjunctivae and sclerae normal  HENT: ear canals and TM's normal, nose and mouth without ulcers or lesions his TMs initially were occluded by cerumen but the wax was irrigated out and his TMs appear normal.  NECK: no adenopathy, no asymmetry, masses, or scars and thyroid normal to palpation  RESP: lungs clear to auscultation - no rales, rhonchi or wheezes  CV: regular rate and rhythm, normal S1 S2, no S3 or S4, no murmur, click or rub, no peripheral edema and peripheral pulses strong  ABDOMEN: soft, nontender, no hepatosplenomegaly, no masses and bowel sounds normal   (male): not indicated   RECTAL: not indicated   MS: no gross musculoskeletal defects noted, no edema  SKIN: no suspicious lesions or rashes  NEURO: Normal strength and tone, mentation intact and speech normal  PSYCH: mentation appears normal, affect normal/bright    Diagnostic Test Results:  Labs are pending.    ASSESSMENT/PLAN:   (Z00.00) Routine general medical examination at a health care facility  (primary encounter diagnosis)  Comment: Overall doing well with just a few identified issues below.  Plan: See below    (G47.00) Insomnia, unspecified type  Comment: He " has been taking 10 mg of Ambien so we discussed changing the dose to see if he gets less drowsy from it.  Plan: zolpidem (AMBIEN) 5 MG tablet        I told him that he should be taking the Ambien by 8 to 9:00 in the evening and it should be completely out of his system by 6 AM when he wakes up for work.  We will try this to see if it works better for him.    (E66.09,  Z68.38) Class 2 obesity due to excess calories without serious comorbidity with body mass index (BMI) of 38.0 to 38.9 in adult  Comment: He has gained a significant amount of weight over the past 10 years and really does not track what he eats it feels like he does not eat a lot.  Eats breakfast may be in monster drink for lunch and dinner at about 5 PM.  Every morning he has to breakfast sandwiches which probably is a significant number of calories so I talked to him about tracking his calories on a smart phone mao.  Plan: TSH with free T4 reflex        Is going to download this appendectomy and start counting his calories and looking at the macros to see the breakdown of triglycerides versus fats and proteins.  We are certainly check his TSH today.    (Z13.1) Screening for diabetes mellitus  Comment: Does not have a family history of diabetes.  Is obese so we will need screening today.  Plan: Basic metabolic panel        Blood was drawn.    (F20.89) Other Schizophrenia (H)  Comment: He has been on the risperidone for his schizophrenia but has not been taking it correctly.  He is supposed be taking it twice a day and has only been using it once at bedtime.  He now feels like he is having more symptoms of his hernia by seeing your shadows.  Plan: risperiDONE (RISPERDAL) 1 MG tablet        We will increase this to 1 tablet twice a day which it was originally written for but he has not been taking.  We will see if this works better for him we can also have him take both tablets at at bedtime but I think the get better results by using it twice  "daily.    (Z12.11) Special screening for malignant neoplasms, colon  Comment: He is due for another fit test  Plan: Fecal colorectal cancer screen (FIT)        This was ordered.    (Z23) Need for vaccination  Comment: He is due for tetanus booster and is agreeable to the shingles vaccine.  Plan: TD PRSERV FREE >=7 YRS ADS IM [50835], SHINGRIX        [42549], 1st  Administration  [08502], Each         additional admin.  (Right click and add         QUANTITY)  [44117]        Both were given    (H61.23) Bilateral impacted cerumen  Comment: We needed both ears irrigated  Plan: I talked to him about not using Q-tips and just using soap from the shampoo to work into his ear canals and then rinse well is in the shower.  He will avoid using Q-tips.    COUNSELING:   Reviewed preventive health counseling, as reflected in patient instructions       Regular exercise       Healthy diet/nutrition       Vision screening       Immunizations    Vaccinated for: Td and Zoster          BP Readings from Last 1 Encounters:   01/29/19 132/82     Estimated body mass index is 38.68 kg/m  as calculated from the following:    Height as of this encounter: 1.727 m (5' 8\").    Weight as of this encounter: 115.4 kg (254 lb 6.4 oz).    BP Screening:   Last 3 BP Readings:    BP Readings from Last 3 Encounters:   01/29/19 132/82   09/20/17 136/82   02/28/17 136/70       The following was recommended to the patient:  Re-screen BP within a year and recommended lifestyle modifications  Weight management plan: We discussed caloric intake and increasing his exercise.     reports that  has never smoked. he has never used smokeless tobacco.      Counseling Resources:  ATP IV Guidelines  Pooled Cohorts Equation Calculator  FRAX Risk Assessment  ICSI Preventive Guidelines  Dietary Guidelines for Americans, 2010  USDA's MyPlate  ASA Prophylaxis  Lung CA Screening    Electronically signed by:  David Sheehan M.D.  1/29/2019    "

## 2019-01-30 NOTE — TELEPHONE ENCOUNTER
Patient called back and I gave him the message about his results. He understood them and had no questions at this time.       Thank you,  Radha Quigley   for [facility]

## 2019-01-30 NOTE — TELEPHONE ENCOUNTER
I have attempted to contact this patient by phone with the following results: left message to return my call on answering machine.    Please relay message below and close.  Ada Thomas, [facility]

## 2019-04-04 DIAGNOSIS — G47.09 OTHER INSOMNIA: Primary | ICD-10-CM

## 2019-04-09 RX ORDER — ZOLPIDEM TARTRATE 10 MG/1
TABLET ORAL
Qty: 30 TABLET | Refills: 5 | Status: SHIPPED | OUTPATIENT
Start: 2019-04-09 | End: 2019-10-18

## 2019-04-09 NOTE — TELEPHONE ENCOUNTER
I gave him a refill of his Ambien with 5 refills back in January so he should still have some on record at the pharmacy.  Please check with the pharmacy  .  Electronically signed by:  David Sheehan M.D.  4/9/2019

## 2019-04-09 NOTE — TELEPHONE ENCOUNTER
I spoke with pharmacy and they have been filling it from the Rx from 18 so it is  that's why we got this request.    Looks like Rx from 19 was a local print and patient said that he isn't able to find it he misplaced it.  Wondering if you can fill this again for him and have it at the 10 mg instead of the 5 mg?    Zane Navarro, CMA

## 2019-09-17 ENCOUNTER — TELEPHONE (OUTPATIENT)
Dept: FAMILY MEDICINE | Facility: CLINIC | Age: 54
End: 2019-09-17

## 2019-09-17 NOTE — TELEPHONE ENCOUNTER
Patient is due for a PHQ-9.  Index start date:5/30/2019  Index end date:9/27/2019    Please call patient.

## 2019-09-24 NOTE — TELEPHONE ENCOUNTER
I have attempted to contact pt to update a PHQ-9. I left a message for pt to return my call. Please transfer pt to the [facility] team if I'm unavailable to take the call.  Leny Flowers CMA ([facility])

## 2019-10-18 DIAGNOSIS — G47.09 OTHER INSOMNIA: ICD-10-CM

## 2019-10-23 RX ORDER — ZOLPIDEM TARTRATE 10 MG/1
TABLET ORAL
Qty: 30 TABLET | Refills: 5 | Status: SHIPPED | OUTPATIENT
Start: 2019-10-23 | End: 2020-04-17

## 2020-04-09 DIAGNOSIS — F32.1 MODERATE MAJOR DEPRESSION (H): Primary | ICD-10-CM

## 2020-04-09 RX ORDER — RISPERIDONE 1 MG/1
TABLET ORAL
Qty: 120 TABLET | Refills: 0 | Status: SHIPPED | OUTPATIENT
Start: 2020-04-09 | End: 2020-09-03

## 2020-04-09 NOTE — TELEPHONE ENCOUNTER
The patient will be due for a follow-up visit.  Last time I saw him in clinic was January 2019.  I gave him enough to last for 2 months.  We need to get him scheduled for an appointment.    Electronically signed by:  David Sheehan M.D.  4/9/2020

## 2020-04-09 NOTE — TELEPHONE ENCOUNTER
Patient is scheduled 4/17/20 at 3pm for a phone visit with Dr. Sheehan.    Michell Wing CMA ([facility]) 4/9/2020

## 2020-04-09 NOTE — TELEPHONE ENCOUNTER
Risperidone  Last Written Prescription Date:  01/29/2019  Last Fill Quantity: 180,  # refills: 3   Last office visit: 1/29/2019 with prescribing provider:  Aguila   Future Office Visit:  None  Routing refill request to provider for review/approval because:  Labs not current:  Lipids, CBC, Glucose.   Patient needs to be seen because it has been more than 1 year since last office visit.    Requested Prescriptions   Pending Prescriptions Disp Refills     risperiDONE (RISPERDAL) 1 MG tablet [Pharmacy Med Name: risperiDONE 1 MG Oral Tablet] 60 tablet 0     Sig: Take 1 tablet by mouth twice daily as needed       Antipsychotic Medications Failed - 4/9/2020  3:03 PM        Failed - Blood pressure under 140/90 in past 12 months     BP Readings from Last 3 Encounters:   01/29/19 132/82   09/20/17 136/82   02/28/17 136/70           Failed - Lipid panel on file within the past 12 months     Recent Labs   Lab Test 06/22/16  1521   CHOL 199   TRIG 283*   HDL 41   *   NHDL 158*           Failed - CBC on file in past 12 months     Recent Labs   Lab Test 06/29/12  1605   WBC 7.1   RBC 4.98   HGB 15.6   HCT 44.7              Failed - Heart Rate on file within past 12 months     Pulse Readings from Last 3 Encounters:   01/29/19 63   09/20/17 95   02/28/17 59           Failed - A1c or Glucose on file in past 12 months     Recent Labs   Lab Test 01/29/19  1503   GLC 86     Please review patients last 3 weights. If a weight gain of >10 lbs exists, you may refill the prescription once after instructing the patient to schedule an appointment within the next 30 days.    Wt Readings from Last 3 Encounters:   01/29/19 115.4 kg (254 lb 6.4 oz)   09/20/17 113.4 kg (250 lb)   02/28/17 115.7 kg (255 lb)           Failed - Recent (6 mo) or future (30 days) visit within the authorizing provider's specialty     Patient had office visit in the last 6 months or has a visit in the next 30 days with authorizing provider or within the  "authorizing provider's specialty.  See \"Patient Info\" tab in inbasket, or \"Choose Columns\" in Meds & Orders section of the refill encounter.          Passed - Patient is 12 years of age or older        Passed - Medication is active on med list         Jimena Marshall RN   "

## 2020-04-17 ENCOUNTER — VIRTUAL VISIT (OUTPATIENT)
Dept: FAMILY MEDICINE | Facility: CLINIC | Age: 55
End: 2020-04-17
Payer: COMMERCIAL

## 2020-04-17 DIAGNOSIS — F32.1 MODERATE MAJOR DEPRESSION (H): Primary | ICD-10-CM

## 2020-04-17 DIAGNOSIS — Z12.11 SPECIAL SCREENING FOR MALIGNANT NEOPLASMS, COLON: ICD-10-CM

## 2020-04-17 DIAGNOSIS — G47.09 OTHER INSOMNIA: ICD-10-CM

## 2020-04-17 PROCEDURE — 99214 OFFICE O/P EST MOD 30 MIN: CPT | Mod: 95 | Performed by: FAMILY MEDICINE

## 2020-04-17 RX ORDER — ZOLPIDEM TARTRATE 10 MG/1
TABLET ORAL
Qty: 30 TABLET | Refills: 5 | Status: SHIPPED | OUTPATIENT
Start: 2020-04-17 | End: 2020-11-13

## 2020-04-17 ASSESSMENT — PATIENT HEALTH QUESTIONNAIRE - PHQ9: SUM OF ALL RESPONSES TO PHQ QUESTIONS 1-9: 2

## 2020-04-17 NOTE — PROGRESS NOTES
"Krishna Diaz is a 55 year old male who is being evaluated via a billable telephone visit.      The patient has been notified of following:     \"This telephone visit will be conducted via a call between you and your physician/provider. We have found that certain health care needs can be provided without the need for a physical exam.  This service lets us provide the care you need with a short phone conversation.  If a prescription is necessary we can send it directly to your pharmacy.  If lab work is needed we can place an order for that and you can then stop by our lab to have the test done at a later time.    Telephone visits are billed at different rates depending on your insurance coverage. During this emergency period, for some insurers they may be billed the same as an in-person visit.  Please reach out to your insurance provider with any questions.    If during the course of the call the physician/provider feels a telephone visit is not appropriate, you will not be charged for this service.\"    Patient has given verbal consent for Telephone visit?  Yes    How would you like to obtain your AVS? Mail a copy    Subjective     Krishna Diaz is a 55 year old male who presents to clinic today for the following health issues:    Depression Followup    How are you doing with your depression since your last visit? Improved     Are you having other symptoms that might be associated with depression? No    Have you had a significant life event?  OTHER: moving      Are you feeling anxious or having panic attacks?   Yes:  with moving     Do you have any concerns with your use of alcohol or other drugs? No    Social History     Tobacco Use     Smoking status: Never Smoker     Smokeless tobacco: Never Used   Substance Use Topics     Alcohol use: No     Drug use: No     PHQ 9/20/2017 1/29/2019 4/17/2020   PHQ-9 Total Score 4 10 2   Q9: Thoughts of better off dead/self-harm past 2 weeks Not at all Not at all Not at all "     JADA-7 SCORE 9/20/2017   Total Score 0     Last PHQ-9 4/17/2020   1.  Little interest or pleasure in doing things 0   2.  Feeling down, depressed, or hopeless 1   3.  Trouble falling or staying asleep, or sleeping too much 0   4.  Feeling tired or having little energy 0   5.  Poor appetite or overeating 0   6.  Feeling bad about yourself 1   7.  Trouble concentrating 0   8.  Moving slowly or restless 0   Q9: Thoughts of better off dead/self-harm past 2 weeks 0   PHQ-9 Total Score 2   Difficulty at work, home, or with people Not difficult at all     JADA-7  9/20/2017   1. Feeling nervous, anxious, or on edge 0   2. Not being able to stop or control worrying 0   3. Worrying too much about different things 0   4. Trouble relaxing 0   5. Being so restless that it is hard to sit still 0   6. Becoming easily annoyed or irritable 0   7. Feeling afraid, as if something awful might happen 0   JADA-7 Total Score 0   If you checked any problems, how difficult have they made it for you to do your work, take care of things at home, or get along with other people? Somewhat difficult     In the past two weeks have you had thoughts of suicide or self-harm?  No.    Do you have concerns about your personal safety or the safety of others?   No    Suicide Assessment Five-step Evaluation and Treatment (SAFE-T)      How many servings of fruits and vegetables do you eat daily?  2-3    On average, how many sweetened beverages do you drink each day (Examples: soda, juice, sweet tea, etc.  Do NOT count diet or artificially sweetened beverages)?   1    How many days per week do you exercise enough to make your heart beat faster? Not currently     How many minutes a day do you exercise enough to make your heart beat faster? Not currently     How many days per week do you miss taking your medication? 0       PROBLEMS TO ADD ON...  Feels like he is doing well for his depression.  He is moving to [facility].  He had been living in the basement of  his ex-wife's house, but she recently got remarried so is moving to her new 's house in the now [facility] area.  He will be essentially caretaking for the house and keeping the yard work up.  He tends to be more to himself and does not get out and do much.  He is not dating and does not do any regular exercise.  He is not suicidal at all and feels overall that his moods are very well controlled.  He is still working full-time and is deemed an essential worker so he feels like his job is protected.    I did talk to him about his Ambien which it looks like he is due for a refill.  He just got his last refill so we will need a new prescription for that.  He has been using Allegra 24-hour and over-the-counter Sudafed for his allergies.  This time of year is always been bad for him.    I did review his labs and he is up-to-date on everything but his fecal occult blood test for colon cancer.  He does not want to do another colonoscopy and would rather just do yearly fecal occult bloods or FIT tests.  I informed him that he could contact the [facility] ([facility] and pick 1 of these up from the lab and then just drop it back off of that lab.  The order is in place.    Patient Active Problem List   Diagnosis     Other specific developmental learning difficulties     Restless leg     HYPERLIPIDEMIA LDL GOAL <130     ADHD (attention deficit hyperactivity disorder)     Moderate major depression (H)     Suicidal ideation     Generalized anxiety disorder     Anxiety     Right inguinal hernia     Right testicular pain     Other schizophrenia (H)     Past Surgical History:   Procedure Laterality Date     HC COLONOSCOPY W BIOPSY  3/8/2006     REMOVAL OF SPERM DUCT(S)         Social History     Tobacco Use     Smoking status: Never Smoker     Smokeless tobacco: Never Used   Substance Use Topics     Alcohol use: No     Family History   Problem Relation Age of Onset     Depression Mother         suicide attempts x 2     Cancer Mother          brain tumor     Other - See Comments Father 89     Other - See Comments Sister 57        overdosed from pain pills accidentally     Psychotic Disorder Son         ADD     Psychotic Disorder Daughter         ADD         Current Outpatient Medications   Medication Sig Dispense Refill     Fexofenadine HCl (ALLEGRA ALLERGY PO)        Multiple Vitamin (MULTI-VITAMIN) per tablet Take 1 tablet by mouth daily.         risperiDONE (RISPERDAL) 1 MG tablet Take 1 tablet by mouth twice daily as needed 120 tablet 0     zolpidem (AMBIEN) 10 MG tablet TAKE 1 TABLET BY MOUTH AT BEDTIME AS NEEDED FOR SLEEP 30 tablet 5     Allergies   Allergen Reactions     No Known Drug Allergies      Recent Labs   Lab Test 01/29/19  1503 06/22/16  1521 06/29/12  1605   LDL  --  101*  --    HDL  --  41  --    TRIG  --  283*  --    ALT  --   --  34   CR 0.83  --  0.73   GFRESTIMATED >90  --  >90   GFRESTBLACK >90  --  >90   POTASSIUM 4.0  --  4.4   TSH 2.45  --  1.56      BP Readings from Last 3 Encounters:   01/29/19 132/82   09/20/17 136/82   02/28/17 136/70    Wt Readings from Last 3 Encounters:   01/29/19 115.4 kg (254 lb 6.4 oz)   09/20/17 113.4 kg (250 lb)   02/28/17 115.7 kg (255 lb)                    Reviewed and updated as needed this visit by Provider  Tobacco  Allergies  Meds  Problems  Med Hx  Surg Hx  Fam Hx         Review of Systems   ROS COMP: Constitutional, HEENT, cardiovascular, pulmonary, gi and gu systems are negative, except as otherwise noted.       Objective   Reported vitals:  There were no vitals taken for this visit.   He sounds good on the phone and is not in any distress.  She is got good affect with his speech.  Remainder of exam unable to be completed due to telephone visits    Diagnostic Test Results:  Labs reviewed in Epic  I did place an order for his fit test for him to  for colon cancer screening.        Assessment/Plan:  (F32.1) Moderate major depression (H)  (primary encounter  diagnosis)  Comment: Feelings of depression is doing much better and has no concerns.  He has no issues taking his medication.  Plan: Refills of his Risperdal were done earlier this month.  He will contact me if he notices worsening symptoms.    (G47.09) Other insomnia  Comment: He needs a refill of his zolpidem  Plan: zolpidem (AMBIEN) 10 MG tablet        This was sent to the pharmacy for him.    (Z12.11) Special screening for malignant neoplasms, colon  Comment: He is overdue for his fit test  Plan: Fecal colorectal cancer screen (FIT)        I reordered this for him and he will pick it up to complete the screening.        Return in about 6 months (around 10/17/2020).      Phone call duration:  18 minutes    Electronically signed by:  David Sheehan M.D.  4/17/2020

## 2020-07-28 ENCOUNTER — TRANSFERRED RECORDS (OUTPATIENT)
Dept: HEALTH INFORMATION MANAGEMENT | Facility: CLINIC | Age: 55
End: 2020-07-28

## 2020-08-25 DIAGNOSIS — F32.1 MODERATE MAJOR DEPRESSION (H): ICD-10-CM

## 2020-08-26 NOTE — TELEPHONE ENCOUNTER
Routing refill request to provider for review/approval because:  Labs not current:  Bp. CBC, FLP, HR, Glucose    Risperidone   Last Written Prescription Date:  4/9/2020  Last Fill Quantity: 120,  # refills: 0   Last office visit: 4/17/2020 with prescribing provider:  [place]   Future Office Visit:      Requested Prescriptions   Pending Prescriptions Disp Refills     risperiDONE (RISPERDAL) 1 MG tablet [Pharmacy Med Name: risperiDONE 1 MG Oral Tablet] 120 tablet 0     Sig: Take 1 tablet by mouth twice daily as needed       Antipsychotic Medications Failed - 8/25/2020  2:33 PM        Failed - Blood pressure under 140/90 in past 12 months     BP Readings from Last 3 Encounters:   01/29/19 132/82   09/20/17 136/82   02/28/17 136/70                 Failed - Lipid panel on file within the past 12 months     Recent Labs   Lab Test 06/22/16  1521   CHOL 199   TRIG 283*   HDL 41   *   NHDL 158*               Failed - CBC on file in past 12 months     Recent Labs   Lab Test 06/29/12  1605   WBC 7.1   RBC 4.98   HGB 15.6   HCT 44.7                    Failed - Heart Rate on file within past 12 months     Pulse Readings from Last 3 Encounters:   01/29/19 63   09/20/17 95   02/28/17 59               Failed - A1c or Glucose on file in past 12 months     Recent Labs   Lab Test 01/29/19  1503   GLC 86       Please review patients last 3 weights. If a weight gain of >10 lbs exists, you may refill the prescription once after instructing the patient to schedule an appointment within the next 30 days.    Wt Readings from Last 3 Encounters:   01/29/19 115.4 kg (254 lb 6.4 oz)   09/20/17 113.4 kg (250 lb)   02/28/17 115.7 kg (255 lb)             Passed - Patient is 12 years of age or older        Passed - Medication is active on med list        Passed - Recent (6 mo) or future (30 days) visit within the authorizing provider's specialty     Patient had office visit in the last 6 months or has a visit in the next 30 days with  "authorizing provider or within the authorizing provider's specialty.  See \"Patient Info\" tab in inbasket, or \"Choose Columns\" in Meds & Orders section of the refill encounter.               Neva Hernandez RN on 8/26/2020 at 1:06 PM    "

## 2020-09-03 RX ORDER — RISPERIDONE 1 MG/1
TABLET ORAL
Qty: 180 TABLET | Refills: 3 | Status: SHIPPED | OUTPATIENT
Start: 2020-09-03 | End: 2021-10-21

## 2020-09-21 NOTE — TELEPHONE ENCOUNTER
Zolpidem 10 MG       Last Written Prescription Date:  1/29/19  Last Fill Quantity: 60,   # refills: 5  Last Office Visit: 1/29/19  Future Office visit:       Routing refill request to provider for review/approval because:  Drug not on the [facility], [facility] or [facility] refill protocol or controlled substance     Anticipated Defect Repair: Our office following tumor clearance

## 2020-11-12 DIAGNOSIS — G47.09 OTHER INSOMNIA: ICD-10-CM

## 2020-11-12 NOTE — TELEPHONE ENCOUNTER
Ambien       Last Written Prescription Date:  04/17/2020  Last Fill Quantity: 30,   # refills: 5  Last Office Visit: 04/20/2020  Future Office visit:  None   Routing refill request to provider for review/approval because:  Drug not on the [facility], [facility] or  Health refill protocol or controlled substance    Jimena Marshall RN

## 2020-11-13 RX ORDER — ZOLPIDEM TARTRATE 10 MG/1
TABLET ORAL
Qty: 30 TABLET | Refills: 0 | Status: SHIPPED | OUTPATIENT
Start: 2020-11-13 | End: 2021-02-25

## 2021-01-24 ENCOUNTER — TRANSFERRED RECORDS (OUTPATIENT)
Dept: HEALTH INFORMATION MANAGEMENT | Facility: CLINIC | Age: 56
End: 2021-01-24

## 2021-01-28 ENCOUNTER — HOSPITAL ENCOUNTER (EMERGENCY)
Facility: CLINIC | Age: 56
Discharge: HOME | End: 2021-01-28
Attending: PHYSICIAN ASSISTANT | Admitting: PHYSICIAN ASSISTANT
Payer: COMMERCIAL

## 2021-01-28 VITALS
TEMPERATURE: 98.2 F | DIASTOLIC BLOOD PRESSURE: 84 MMHG | RESPIRATION RATE: 18 BRPM | BODY MASS INDEX: 41.66 KG/M2 | HEART RATE: 48 BPM | OXYGEN SATURATION: 98 % | WEIGHT: 274 LBS | SYSTOLIC BLOOD PRESSURE: 130 MMHG

## 2021-01-28 DIAGNOSIS — R60.0 LEG EDEMA, LEFT: ICD-10-CM

## 2021-01-28 DIAGNOSIS — L03.116 CELLULITIS OF LEFT LOWER EXTREMITY: ICD-10-CM

## 2021-01-28 LAB
ANION GAP SERPL CALCULATED.3IONS-SCNC: <1 MMOL/L (ref 3–14)
BASOPHILS # BLD AUTO: 0.1 10E9/L (ref 0–0.2)
BASOPHILS NFR BLD AUTO: 0.9 %
BUN SERPL-MCNC: 10 MG/DL (ref 7–30)
CALCIUM SERPL-MCNC: 8.9 MG/DL (ref 8.5–10.1)
CHLORIDE SERPL-SCNC: 106 MMOL/L (ref 94–109)
CO2 SERPL-SCNC: 33 MMOL/L (ref 20–32)
CREAT SERPL-MCNC: 0.75 MG/DL (ref 0.66–1.25)
CRP SERPL-MCNC: 10.7 MG/L (ref 0–8)
DIFFERENTIAL METHOD BLD: NORMAL
EOSINOPHIL NFR BLD AUTO: 3.4 %
ERYTHROCYTE [DISTWIDTH] IN BLOOD BY AUTOMATED COUNT: 11.9 % (ref 10–15)
GFR SERPL CREATININE-BSD FRML MDRD: >90 ML/MIN/{1.73_M2}
GLUCOSE SERPL-MCNC: 74 MG/DL (ref 70–99)
HBA1C MFR BLD: 5.5 % (ref 0–5.6)
HCT VFR BLD AUTO: 41.1 % (ref 40–53)
HGB BLD-MCNC: 14.1 G/DL (ref 13.3–17.7)
IMM GRANULOCYTES # BLD: 0 10E9/L (ref 0–0.4)
IMM GRANULOCYTES NFR BLD: 0.5 %
LACTATE BLD-SCNC: 1 MMOL/L (ref 0.7–2)
LYMPHOCYTES # BLD AUTO: 1.2 10E9/L (ref 0.8–5.3)
LYMPHOCYTES NFR BLD AUTO: 18.1 %
MCH RBC QN AUTO: 31.3 PG (ref 26.5–33)
MCHC RBC AUTO-ENTMCNC: 34.3 G/DL (ref 31.5–36.5)
MCV RBC AUTO: 91 FL (ref 78–100)
MONOCYTES # BLD AUTO: 0.8 10E9/L (ref 0–1.3)
MONOCYTES NFR BLD AUTO: 12.3 %
NEUTROPHILS # BLD AUTO: 4.1 10E9/L (ref 1.6–8.3)
NEUTROPHILS NFR BLD AUTO: 64.8 %
NRBC # BLD AUTO: 0 10*3/UL
NRBC BLD AUTO-RTO: 0 /100
PLATELET # BLD AUTO: 322 10E9/L (ref 150–450)
POTASSIUM SERPL-SCNC: 4.2 MMOL/L (ref 3.4–5.3)
RBC # BLD AUTO: 4.5 10E12/L (ref 4.4–5.9)
SODIUM SERPL-SCNC: 139 MMOL/L (ref 133–144)
WBC # BLD AUTO: 6.4 10E9/L (ref 4–11)

## 2021-01-28 PROCEDURE — 99284 EMERGENCY DEPT VISIT MOD MDM: CPT | Performed by: PHYSICIAN ASSISTANT

## 2021-01-28 PROCEDURE — 99284 EMERGENCY DEPT VISIT MOD MDM: CPT | Mod: 25 | Performed by: PHYSICIAN ASSISTANT

## 2021-01-28 PROCEDURE — 83605 ASSAY OF LACTIC ACID: CPT | Performed by: PHYSICIAN ASSISTANT

## 2021-01-28 PROCEDURE — 86140 C-REACTIVE PROTEIN: CPT | Performed by: PHYSICIAN ASSISTANT

## 2021-01-28 PROCEDURE — 250N000013 HC RX MED GY IP 250 OP 250 PS 637: Performed by: PHYSICIAN ASSISTANT

## 2021-01-28 PROCEDURE — 250N000009 HC RX 250: Performed by: PHYSICIAN ASSISTANT

## 2021-01-28 PROCEDURE — 83036 HEMOGLOBIN GLYCOSYLATED A1C: CPT | Performed by: PHYSICIAN ASSISTANT

## 2021-01-28 PROCEDURE — 85025 COMPLETE CBC W/AUTO DIFF WBC: CPT | Performed by: PHYSICIAN ASSISTANT

## 2021-01-28 PROCEDURE — 96365 THER/PROPH/DIAG IV INF INIT: CPT | Performed by: PHYSICIAN ASSISTANT

## 2021-01-28 PROCEDURE — 80048 BASIC METABOLIC PNL TOTAL CA: CPT | Performed by: PHYSICIAN ASSISTANT

## 2021-01-28 RX ORDER — CLINDAMYCIN PHOSPHATE 900 MG/50ML
900 INJECTION, SOLUTION INTRAVENOUS ONCE
Status: COMPLETED | OUTPATIENT
Start: 2021-01-28 | End: 2021-01-28

## 2021-01-28 RX ORDER — CLINDAMYCIN HCL 300 MG
300 CAPSULE ORAL 4 TIMES DAILY
Qty: 40 CAPSULE | Refills: 0 | Status: SHIPPED | OUTPATIENT
Start: 2021-01-28 | End: 2021-02-07

## 2021-01-28 RX ORDER — FEXOFENADINE HCL 180 MG/1
180 TABLET ORAL DAILY
COMMUNITY

## 2021-01-28 RX ORDER — IBUPROFEN 600 MG/1
600 TABLET, FILM COATED ORAL ONCE
Status: COMPLETED | OUTPATIENT
Start: 2021-01-28 | End: 2021-01-28

## 2021-01-28 RX ORDER — CEPHALEXIN 500 MG/1
500 CAPSULE ORAL 4 TIMES DAILY
COMMUNITY
Start: 2021-01-24 | End: 2021-02-03

## 2021-01-28 RX ORDER — IBUPROFEN 200 MG
800 TABLET ORAL EVERY 8 HOURS PRN
COMMUNITY

## 2021-01-28 RX ADMIN — CLINDAMYCIN IN 5 PERCENT DEXTROSE 900 MG: 18 INJECTION, SOLUTION INTRAVENOUS at 18:50

## 2021-01-28 RX ADMIN — IBUPROFEN 600 MG: 600 TABLET, FILM COATED ORAL at 19:46

## 2021-01-28 NOTE — ED TRIAGE NOTES
Patient states hes been on Keflex since Sunday for a wound infection to his leg. Wound isn't improving.

## 2021-01-29 NOTE — ED PROVIDER NOTES
History     Chief Complaint   Patient presents with     Wound Infection     HPI  Krishna Diaz is a 55 year old male who presents for evaluation of left lower leg swelling, redness, and discomfort.  He was evaluated in an outside ED on 1/24/2021 as noted with the pasted dictation below.  Patient states that he initially started feeling feverish.  His work required him to get a Covid test.  At that time, he did not notice any problems with his leg.  Then, 6 days ago he noticed swelling and redness of his left lower leg.  No trauma that he can recall.  States that he has been struggling with an open skin area in between his first and second toes which he presumes is a fungus.  Has been using OTC cream.  Notes that he did experience sensation of having a fever a week ago for about 2-3 days, but has not had any fever or chills since then.  Did not document his temperature at that time.  Rates his current discomfort 3 on a scale of 10 and states that it is dull and achy.  He has been taking his cephalexin on a regular basis.  Notes that the redness and warmth sensation of the lower leg has decreased significantly in the past 4 days, but the swelling persist.  Patient denies any comorbid conditions.  Does admit to polydipsia and polyuria.  His sister has diabetes, but he has never been told that he has diabetes.  Chart was reviewed in detail.  He did have a nonfasting glucose elevated 126 with his most recent ED evaluation.          Excerpts from his ED visit on 1/24/21:    History of Present Illness:     Krishna Diaz is a 55 y.o. male who presents with left leg and pain and swelling. Five days ago, patient was sent home from work for a subjective fever. He has had no fever since then. He was instructed to get a COVID test prior to returning to work, and he got this on 1/18/21, and it came back negative. However, 4 days ago, he noticed new onset of left leg discomfort, swelling and redness. This has continued to  worsen since onset, prompting his presentation to the emergency department. Here, he reports continued left foot pain of 2/10 in severity. The patient denies any injuries from falling, sore throat, loss of taste or smell, cough, or shortness of breath. Of note, the patient does have a known fungal infection to his left foot, which he has been trying to treat at home.     PE/DVT Risk Factors:   He denies any personal history of blood clots, however, his father did have a history of blood clots. He denies any recent long drives or other periods of immobilization.     Imaging:  US VENOUS LOWER EXTREMITY LEFT  1.  No deep venous thrombosis in the left lower extremity  Results per radiology.     Laboratory:  CBC: WNL (WBC 8.4, HGB 14, )  BMP:  (high), O/W WNL (CREAT .87)  C-Reactive Protein: 8.14 (high)    ED Course:   Reviewed:  I reviewed the patient's past medical history, previous medical records and nursing notes    Assessments:  12:57 AM I initially evaluated the patient in ED room 24.    Interventions:  All Medication Administration through 01/24/2021 0237   Date/Time Order Dose Route Action   01/24/2021 0203 cephalexin 500 mg capsule (KEFLEX) 500 mg Oral Given   01/24/2021 0114 ibuprofen 600 mg tablet (ADVIL; MOTRIN) 600 mg Oral Given       Disposition:  The patient was discharged to home.    Impression and Plan:   Krishna Diaz is a 55 y.o. male coming in today with a left lower extremity cellulitis. He does not meet sepsis criteria based on vital signs and laboratory workup today. Ultrasound was performed and is negative for DVT. He has no risk factors for DVT or PE today. He was given a first dose of cephalexin here and a prescription for a 10 day course. Red flags that would prompt return visit to the emergency department were discussed with him and follow up with primary care the next one to two weeks was discussed. Work note was provided as well. He was given instructions to keep his legs  elevated and supportive care. He was discharged in stable condition.    Diagnosis:   ENCOUNTER DIAGNOSES   ICD-10-CM   1. Cellulitis of left lower extremity L03.116 cephalexin (KEFLEX) 500 mg capsule     I, Kaiden Cobian, am serving as a scribe to document services personally performed by Radha Huang DO, based on my observations and the provider's statements to me.    I, Jessica Ruiz, am serving as a scribe to document services personally performed by Radha Huang DO, based on my observations and the provider's statements to me.     1/24/2021  [facility] EMERGENCY ROOM            Allergies:  Allergies   Allergen Reactions     No Known Drug Allergies        Problem List:    Patient Active Problem List    Diagnosis Date Noted     Other schizophrenia (H) 01/29/2019     Priority: Medium     Right inguinal hernia 11/06/2014     Priority: Medium     Right testicular pain 11/06/2014     Priority: Medium     Anxiety 09/20/2012     Priority: Medium     Generalized anxiety disorder 09/05/2012     Priority: Medium     Diagnosis updated by automated process. Provider to review and confirm.       Suicidal ideation 06/29/2012     Priority: Medium     Moderate major depression (H) 06/04/2012     Priority: Medium     ADHD (attention deficit hyperactivity disorder) 05/30/2012     Priority: Medium     HYPERLIPIDEMIA LDL GOAL <130 10/31/2010     Priority: Medium     Restless leg 03/05/2009     Priority: Medium     Other specific developmental learning difficulties 09/03/2004     Priority: Medium        Past Medical History:    Past Medical History:   Diagnosis Date     ADD (attention deficit disorder)      Mixed hyperlipidemia 6/25/2004     OBESITY NOS 9/3/2004     OTHER LEARNING DIFFICULTY 9/3/2004     Restless leg 3/5/2009     Severe Depression [296.33] 5/17/2009       Past Surgical History:    Past Surgical History:   Procedure Laterality Date     HC COLONOSCOPY W BIOPSY  3/8/2006     REMOVAL  OF SPERM DUCT(S)         Family History:    Family History   Problem Relation Age of Onset     Depression Mother         suicide attempts x 2     Cancer Mother         brain tumor     Other - See Comments Father 89     Other - See Comments Sister 57        overdosed from pain pills accidentally     Psychotic Disorder Son         ADD     Psychotic Disorder Daughter         ADD       Social History:  Marital Status:   [4]  Social History     Tobacco Use     Smoking status: Never Smoker     Smokeless tobacco: Never Used   Substance Use Topics     Alcohol use: No     Drug use: No        Medications:         cephALEXin (KEFLEX) 500 MG capsule       clindamycin (CLEOCIN) 300 MG capsule       fexofenadine (ALLEGRA) 180 MG tablet       ibuprofen (ADVIL/MOTRIN) 200 MG tablet       Multiple Vitamin (MULTI-VITAMIN) per tablet       risperiDONE (RISPERDAL) 1 MG tablet       zolpidem (AMBIEN) 10 MG tablet          Review of Systems   All other systems reviewed and are negative.      Physical Exam   BP: (!) 151/90  Pulse: (!) 49  Temp: 98.7  F (37.1  C)  Resp: 20  Weight: 124.3 kg (274 lb)  SpO2: 95 %      Physical Exam  Vitals signs and nursing note reviewed.   Constitutional:       General: He is not in acute distress.     Appearance: He is not diaphoretic.   HENT:      Head: Normocephalic and atraumatic.      Right Ear: External ear normal.      Left Ear: External ear normal.      Nose: Nose normal.      Mouth/Throat:      Pharynx: No oropharyngeal exudate.   Eyes:      General: No scleral icterus.        Right eye: No discharge.         Left eye: No discharge.      Conjunctiva/sclera: Conjunctivae normal.      Pupils: Pupils are equal, round, and reactive to light.   Neck:      Musculoskeletal: Normal range of motion and neck supple.      Thyroid: No thyromegaly.   Cardiovascular:      Rate and Rhythm: Normal rate and regular rhythm.      Heart sounds: Normal heart sounds. No murmur.   Pulmonary:      Effort:  Pulmonary effort is normal. No respiratory distress.      Breath sounds: Normal breath sounds. No wheezing or rales.   Chest:      Chest wall: No tenderness.   Abdominal:      General: Bowel sounds are normal. There is no distension.      Palpations: Abdomen is soft. There is no mass.      Tenderness: There is no abdominal tenderness. There is no guarding or rebound.   Musculoskeletal: Normal range of motion.      Comments: Area of erythema to the anterior/medial lower leg without increased warmth, induration, or fluctuance.  He does have 2+ edema of this leg when compared to the 1+ edema on the right lower extremity.  No palpable cord in the calf.  Negative Homans' sign.  No significant tenderness.  Appears to have tinea pedis in between his first and second toes with cracked skin which could be the source of the infection.   Lymphadenopathy:      Cervical: No cervical adenopathy.   Skin:     General: Skin is warm and dry.      Capillary Refill: Capillary refill takes less than 2 seconds.      Findings: No erythema or rash.   Neurological:      Mental Status: He is alert and oriented to person, place, and time.      Cranial Nerves: No cranial nerve deficit.   Psychiatric:         Behavior: Behavior normal.         Thought Content: Thought content normal.                 ED Course        Procedures               Critical Care time:  none                 Results for orders placed or performed during the hospital encounter of 01/28/21 (from the past 24 hour(s))   CBC with platelets differential   Result Value Ref Range    WBC 6.4 4.0 - 11.0 10e9/L    RBC Count 4.50 4.4 - 5.9 10e12/L    Hemoglobin 14.1 13.3 - 17.7 g/dL    Hematocrit 41.1 40.0 - 53.0 %    MCV 91 78 - 100 fl    MCH 31.3 26.5 - 33.0 pg    MCHC 34.3 31.5 - 36.5 g/dL    RDW 11.9 10.0 - 15.0 %    Platelet Count 322 150 - 450 10e9/L    Diff Method Automated Method     % Neutrophils 64.8 %    % Lymphocytes 18.1 %    % Monocytes 12.3 %    % Eosinophils 3.4 %     % Basophils 0.9 %    % Immature Granulocytes 0.5 %    Nucleated RBCs 0 0 /100    Absolute Neutrophil 4.1 1.6 - 8.3 10e9/L    Absolute Lymphocytes 1.2 0.8 - 5.3 10e9/L    Absolute Monocytes 0.8 0.0 - 1.3 10e9/L    Absolute Basophils 0.1 0.0 - 0.2 10e9/L    Abs Immature Granulocytes 0.0 0 - 0.4 10e9/L    Absolute Nucleated RBC 0.0    Basic metabolic panel   Result Value Ref Range    Sodium 139 133 - 144 mmol/L    Potassium 4.2 3.4 - 5.3 mmol/L    Chloride 106 94 - 109 mmol/L    Carbon Dioxide 33 (H) 20 - 32 mmol/L    Anion Gap <1 (L) 3 - 14 mmol/L    Glucose 74 70 - 99 mg/dL    Urea Nitrogen 10 7 - 30 mg/dL    Creatinine 0.75 0.66 - 1.25 mg/dL    GFR Estimate >90 >60 mL/min/[1.73_m2]    GFR Estimate If Black >90 >60 mL/min/[1.73_m2]    Calcium 8.9 8.5 - 10.1 mg/dL   Hemoglobin A1c   Result Value Ref Range    Hemoglobin A1C 5.5 0 - 5.6 %   CRP inflammation   Result Value Ref Range    CRP Inflammation 10.7 (H) 0.0 - 8.0 mg/L   Lactic acid whole blood   Result Value Ref Range    Lactic Acid 1.0 0.7 - 2.0 mmol/L       Medications   clindamycin (CLEOCIN) infusion 900 mg (0 mg Intravenous Stopped 1/28/21 1942)   ibuprofen (ADVIL/MOTRIN) tablet 600 mg (600 mg Oral Given 1/28/21 1946)       Assessments & Plan (with Medical Decision Making)     Cellulitis of left lower extremity  Leg edema, left     55 year old male presents for evaluation of left lower extremity erythema, discomfort, and swelling present for the past 6 days treated with cephalexin during ED visit 4 days ago where he underwent laboratory evaluation and ultrasound which ruled out DVT.  He is concerned that the swelling persists.  Admits that the redness and discomfort has decreased.  No fevers or chills.  He is concerned that he needs IV antibiotic therapy.  On exam blood pressure 151/90, temperature 98.7, pulse 49, respiration 20, oxygen saturation 95% on room air.  2+ edema to left lower extremity with 1+ edema on the right.  Area of light erythema without  induration or fluctuance.  No significant tenderness to palpation.  Negative Homans' sign.  IV established.  Laboratory levels display improved levels.  His white blood cell count is down to 6400 and hemoglobin is stable at 14.1.  Basic metabolic panel with a normal blood glucose this time at 74.  Given his polydipsia and polyuria with elevated glucose at his last ED visit we did confirm that he does not have diabetes with an A1c of 5.5%.  CRP mildly elevated 10.7 and his lactic acid level was normal at 1.0.   While awaiting laboratory results, we did give him an IV dose of clindamycin given that he had not taken his regular antibiotic medication today.  We discussed that clinically his symptoms appear to be improving.  He does struggle with the ongoing edema, but the patient is obese and does have some dependent edema in his contralateral extremity as well.  The importance of compression discussed with him.  Encouraged him to purchase OTC compression stockings.  I wrapped his foot and lower leg in two 6 inch Ace bandages here to provide compression right away.  I encouraged him to continue the cephalexin.  I do not see any evidence for MRSA, but this certainly could be a concern.  If he is worsening or not improving on the cephalexin, I did give him a written prescription for clindamycin to switch to.  Elevation recommended.  Heat to be applied to the left lower extremity for 20 minutes every couple hours.  Indications for ED return reviewed.  Patient was in agreement with this plan and was suitable for discharge.     I have reviewed the nursing notes.    I have reviewed the findings, diagnosis, plan and need for follow up with the patient.       Discharge Medication List as of 1/28/2021  8:41 PM      START taking these medications    Details   clindamycin (CLEOCIN) 300 MG capsule Take 1 capsule (300 mg) by mouth 4 times daily for 10 days, Disp-40 capsule, R-0, Local Print             Final diagnoses:   Cellulitis  of left lower extremity   Leg edema, left       Disclaimer: This note consists of symbols derived from keyboarding, dictation and/or voice recognition software. As a result, there may be errors in the script that have gone undetected. Please consider this when interpreting information found in this chart.      1/28/2021   [facility] EMERGENCY DEPT     Fantasma Dye PA-C  01/28/21 1669

## 2021-01-29 NOTE — DISCHARGE INSTRUCTIONS
It was a pleasure working with you today!  I hope your condition improves rapidly!     Thankfully, your exam and your laboratory numbers are all improving.  I would continue the cephalexin antibiotic at this point.  Switch to taking the clindamycin antibiotic in the event that your redness increases, pain increases, or if you start to develop fevers/chills.  I think the most important thing is to help with the leg swelling.  Wrap your leg with the Ace bandage like we did here.  Please consider getting a compression stocking to wear.  You can purchase compression stockings at your pharmacy.  Elevate your legs is much as possible to help with the swelling.  Avoid adding extra salt to your food.

## 2021-02-04 ENCOUNTER — VIRTUAL VISIT (OUTPATIENT)
Dept: FAMILY MEDICINE | Facility: CLINIC | Age: 56
End: 2021-02-04
Payer: COMMERCIAL

## 2021-02-04 DIAGNOSIS — L03.116 CELLULITIS OF LEFT LOWER EXTREMITY: Primary | ICD-10-CM

## 2021-02-04 DIAGNOSIS — R60.0 PERIPHERAL EDEMA: ICD-10-CM

## 2021-02-04 PROCEDURE — 99214 OFFICE O/P EST MOD 30 MIN: CPT | Mod: 95 | Performed by: FAMILY MEDICINE

## 2021-02-04 ASSESSMENT — PATIENT HEALTH QUESTIONNAIRE - PHQ9: SUM OF ALL RESPONSES TO PHQ QUESTIONS 1-9: 2

## 2021-02-04 NOTE — PROGRESS NOTES
Reyes is a 55 year old who is being evaluated via a billable video visit.      How would you like to obtain your AVS?   If the video visit is dropped, the invitation should be resent by: Text to cell phone: [phone]  Will anyone else be joining your video visit? No      Video Start Time: 11:52 AM  Assessment & Plan     Cellulitis of left lower extremity  The patient was instructed to start the clindamycin as his cellulitis has not resolved after 10 days of cephalexin.  He was instructed to be seen in person at his primary clinic next week.    Peripheral edema  The patient's untreated edema is contributing to his cellulitis.  I suggest that he wear his compression hose every day and try to keep his feet elevated.  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  Due to this I suggested he not work until he is seen at his primary clinic in person next week.      Review of external notes as documented above   Review of prior external note(s) from - CareEverywhere information from [facility] reviewed      30 minutes spent on the date of the encounter doing chart review, review of outside records, review of test results, interpretation of tests, patient visit, documentation and discussion with family         Return in about 1 week (around 2/11/2021) for Cellulitis.    Jimena Lacy [facility]     Reyes is a 55 year old who presents to clinic today for the following health issues     HPI       ED/UC Followup:    Facility:  [facility]   Date of visit: 01/24/2021, 01/28/2021  Reason for visit: cellulitis of left lower extremity   Current Status: doing a little better      The patient was seen in the ER on 1/24/2021 for lower extremity cellulitis.  At that time he was given cephalexin.  He did not see a lot of improvement so he returned to a different ER on 1/28/2021.  Per that ER note he was slowly improving.  They gave him IV clindamycin and  told him to continue his cephalexin.  The second ER also gave him a prescription for clindamycin to switch to if he was not seeing improvement in a few days on the cephalexin.    He did have an ultrasound to rule out DVT and a Covid test which was negative.    His WC count was never elevated but it was lower on his second ER visit.    He did not change to clindamycin         Review of Systems   Constitutional, HEENT, cardiovascular, pulmonary, gi and gu systems are negative, except as otherwise noted.      Objective           Vitals:  No vitals were obtained today due to virtual visit.    Physical Exam   GENERAL: Healthy, alert and no distress  EYES: Eyes grossly normal to inspection.  No discharge or erythema, or obvious scleral/conjunctival abnormalities.  RESP: No audible wheeze, cough, or visible cyanosis.  No visible retractions or increased work of breathing.    SKIN: Visible skin clear. No significant rash, abnormal pigmentation or lesions.  NEURO: Cranial nerves grossly intact.  Mentation and speech appropriate for age.  PSYCH: Mentation appears normal, affect normal/bright, judgement and insight intact, normal speech and appearance well-groomed.          Video-Visit Details    Type of service:  Video Visit    Video End Time:12:03 PM    Originating Location (pt. Location): Home    Distant Location (provider location):  [facility]     Platform used for Video Visit: [employer]

## 2021-02-24 DIAGNOSIS — G47.09 OTHER INSOMNIA: ICD-10-CM

## 2021-02-25 RX ORDER — ZOLPIDEM TARTRATE 10 MG/1
TABLET ORAL
Qty: 31 TABLET | Refills: 5 | Status: SHIPPED | OUTPATIENT
Start: 2021-02-25 | End: 2021-10-21

## 2021-02-25 NOTE — TELEPHONE ENCOUNTER
Ambien      Last Written Prescription Date:  11/13/2020  Last Fill Quantity: 30,   # refills: 0  Last Office Visit: 11/13/2020  Future Office visit:   None  Routing refill request to provider for review/approval because:  Drug not on the [facility], [facility] or  Health refill protocol or controlled substance    Jimena Marshall RN

## 2021-10-14 DIAGNOSIS — G47.09 OTHER INSOMNIA: ICD-10-CM

## 2021-10-14 DIAGNOSIS — F32.1 MODERATE MAJOR DEPRESSION (H): ICD-10-CM

## 2021-10-14 NOTE — LETTER
[address] [address]  Phone: [phone]  Fax: [phone]        October 27, 2021      Krishna Diaz                                                                                                                                [id number] [facility] [address] [facility] [id number] [facility] [id number]            Dear Mr. Diaz,    We are concerned about your health care.  We recently provided you with a medication refill.  Many medications require routine follow-up with your Doctor.      At this time we ask that: You schedule a routine office visit with your physician to follow your medications. Please call the clinic at [phone] option 1 to schedule.     Your prescription: Has been refilled for 1 month so you may have time for the above noted follow-up.      Thank you,      David Sheehan MD  Care Team

## 2021-10-19 NOTE — TELEPHONE ENCOUNTER
Risperidone  Routing refill request to provider for review/approval because:  Labs not current:  Lipids  Patient needs to be seen because it has been more than 1 year since last office visit.    Ambien      Last Written Prescription Date:  02/25/2021  Last Fill Quantity: 31,   # refills: 5  Last Office Visit: 04/17/2020  Future Office visit:   None  Routing refill request to provider for review/approval because:  Drug not on the [facility], [facility] or  Health refill protocol or controlled substance    Jimena Marshall RN

## 2021-10-21 RX ORDER — ZOLPIDEM TARTRATE 10 MG/1
TABLET ORAL
Qty: 30 TABLET | Refills: 5 | Status: SHIPPED | OUTPATIENT
Start: 2021-10-21 | End: 2022-05-02

## 2021-10-21 RX ORDER — RISPERIDONE 1 MG/1
TABLET ORAL
Qty: 180 TABLET | Refills: 1 | Status: SHIPPED | OUTPATIENT
Start: 2021-10-21 | End: 2022-06-09

## 2021-10-21 NOTE — TELEPHONE ENCOUNTER
Needs yearly office visit.  Set him up for a male PE.    Electronically signed by:  David Sheehan M.D.  10/21/2021

## 2021-10-21 NOTE — TELEPHONE ENCOUNTER
Spoke with patient and informed them appointment was needed. I was unable to make appointment due to your schedule being on hold. Please let me know when you are able to see this patient.     Abril Lazcano MA

## 2021-10-21 NOTE — TELEPHONE ENCOUNTER
You can look in November or December and use one of my whole spots especially if it is a chart review.  Try to block 40 minutes for him.    Electronically signed by:  David Sheehan M.D.  10/21/2021

## 2022-02-09 NOTE — TELEPHONE ENCOUNTER
Ambien  Last Written Prescription Date:  04/09/2019  Last Fill Quantity: 30,  # refills: 5   Last office visit: 1/29/2019 with prescribing provider:  Aguila   Future Office Visit:  None    Routing refill request to provider for review/approval because:  Drug not on the [facility] refill protocol     Jimena Marshall RN    CT scheduled 02-09-22.

## 2022-04-29 DIAGNOSIS — G47.09 OTHER INSOMNIA: ICD-10-CM

## 2022-05-02 RX ORDER — ZOLPIDEM TARTRATE 10 MG/1
TABLET ORAL
Qty: 30 TABLET | Refills: 2 | Status: SHIPPED | OUTPATIENT
Start: 2022-05-02 | End: 2022-12-16

## 2022-05-02 NOTE — TELEPHONE ENCOUNTER
Patient needs follow up visit in the clinic prior to further refills. It has been >2 yrs since his last appt.  Please schedule with me or another provider.       Electronically signed by:  David Sheehan M.D.  5/2/2022

## 2022-05-02 NOTE — TELEPHONE ENCOUNTER
Pending Prescriptions:                       Disp   Refills    zolpidem (AMBIEN) 10 MG tablet [Pharmacy M*30 tab*0        Sig: TAKE 1 TABLET BY MOUTH AT BEDTIME AS NEEDED FOR SLEEP    Routing refill request to provider for review/approval because:  Drug not on the [facility] refill protocol     Su Tyson RN

## 2022-06-09 ENCOUNTER — OFFICE VISIT (OUTPATIENT)
Dept: FAMILY MEDICINE | Facility: CLINIC | Age: 57
End: 2022-06-09
Payer: COMMERCIAL

## 2022-06-09 VITALS
OXYGEN SATURATION: 96 % | HEART RATE: 60 BPM | TEMPERATURE: 97.7 F | SYSTOLIC BLOOD PRESSURE: 134 MMHG | RESPIRATION RATE: 14 BRPM | WEIGHT: 265 LBS | BODY MASS INDEX: 40.29 KG/M2 | DIASTOLIC BLOOD PRESSURE: 78 MMHG

## 2022-06-09 DIAGNOSIS — R07.89 OTHER CHEST PAIN: ICD-10-CM

## 2022-06-09 DIAGNOSIS — G47.09 OTHER INSOMNIA: ICD-10-CM

## 2022-06-09 DIAGNOSIS — Z11.4 SCREENING FOR HIV (HUMAN IMMUNODEFICIENCY VIRUS): ICD-10-CM

## 2022-06-09 DIAGNOSIS — F32.1 MODERATE MAJOR DEPRESSION (H): ICD-10-CM

## 2022-06-09 DIAGNOSIS — Z13.220 SCREENING FOR HYPERLIPIDEMIA: ICD-10-CM

## 2022-06-09 DIAGNOSIS — Z12.11 SCREEN FOR COLON CANCER: ICD-10-CM

## 2022-06-09 DIAGNOSIS — Z13.1 SCREENING FOR DIABETES MELLITUS: ICD-10-CM

## 2022-06-09 DIAGNOSIS — M77.11 LATERAL EPICONDYLITIS, RIGHT ELBOW: ICD-10-CM

## 2022-06-09 DIAGNOSIS — E66.01 MORBID OBESITY (H): ICD-10-CM

## 2022-06-09 DIAGNOSIS — Z23 NEED FOR SHINGLES VACCINE: ICD-10-CM

## 2022-06-09 LAB
ALBUMIN SERPL-MCNC: 3.9 G/DL (ref 3.4–5)
ALP SERPL-CCNC: 70 U/L (ref 40–150)
ALT SERPL W P-5'-P-CCNC: 77 U/L (ref 0–70)
ANION GAP SERPL CALCULATED.3IONS-SCNC: 3 MMOL/L (ref 3–14)
AST SERPL W P-5'-P-CCNC: 31 U/L (ref 0–45)
BILIRUB SERPL-MCNC: 1.6 MG/DL (ref 0.2–1.3)
BUN SERPL-MCNC: 11 MG/DL (ref 7–30)
CALCIUM SERPL-MCNC: 8.5 MG/DL (ref 8.5–10.1)
CHLORIDE BLD-SCNC: 105 MMOL/L (ref 94–109)
CHOLEST SERPL-MCNC: 195 MG/DL
CO2 SERPL-SCNC: 30 MMOL/L (ref 20–32)
CREAT SERPL-MCNC: 0.77 MG/DL (ref 0.66–1.25)
FASTING STATUS PATIENT QL REPORTED: NO
GFR SERPL CREATININE-BSD FRML MDRD: >90 ML/MIN/1.73M2
GLUCOSE BLD-MCNC: 119 MG/DL (ref 70–99)
HDLC SERPL-MCNC: 55 MG/DL
LDLC SERPL CALC-MCNC: 116 MG/DL
NONHDLC SERPL-MCNC: 140 MG/DL
POTASSIUM BLD-SCNC: 4 MMOL/L (ref 3.4–5.3)
PROT SERPL-MCNC: 7.8 G/DL (ref 6.8–8.8)
SODIUM SERPL-SCNC: 138 MMOL/L (ref 133–144)
TRIGL SERPL-MCNC: 119 MG/DL

## 2022-06-09 PROCEDURE — 90750 HZV VACC RECOMBINANT IM: CPT | Performed by: FAMILY MEDICINE

## 2022-06-09 PROCEDURE — 36415 COLL VENOUS BLD VENIPUNCTURE: CPT | Performed by: FAMILY MEDICINE

## 2022-06-09 PROCEDURE — 80061 LIPID PANEL: CPT | Performed by: FAMILY MEDICINE

## 2022-06-09 PROCEDURE — 99215 OFFICE O/P EST HI 40 MIN: CPT | Mod: 25 | Performed by: FAMILY MEDICINE

## 2022-06-09 PROCEDURE — 87389 HIV-1 AG W/HIV-1&-2 AB AG IA: CPT | Performed by: FAMILY MEDICINE

## 2022-06-09 PROCEDURE — 90471 IMMUNIZATION ADMIN: CPT | Performed by: FAMILY MEDICINE

## 2022-06-09 PROCEDURE — 80053 COMPREHEN METABOLIC PANEL: CPT | Performed by: FAMILY MEDICINE

## 2022-06-09 RX ORDER — RISPERIDONE 1 MG/1
1 TABLET ORAL 2 TIMES DAILY
Qty: 180 TABLET | Refills: 3 | Status: SHIPPED | OUTPATIENT
Start: 2022-06-09 | End: 2023-07-18

## 2022-06-09 RX ORDER — HYDROXYZINE HYDROCHLORIDE 25 MG/1
25-100 TABLET, FILM COATED ORAL
Qty: 120 TABLET | Refills: 3 | Status: SHIPPED | OUTPATIENT
Start: 2022-06-09

## 2022-06-09 ASSESSMENT — PAIN SCALES - GENERAL: PAINLEVEL: MILD PAIN (2)

## 2022-06-09 ASSESSMENT — PATIENT HEALTH QUESTIONNAIRE - PHQ9
10. IF YOU CHECKED OFF ANY PROBLEMS, HOW DIFFICULT HAVE THESE PROBLEMS MADE IT FOR YOU TO DO YOUR WORK, TAKE CARE OF THINGS AT HOME, OR GET ALONG WITH OTHER PEOPLE: SOMEWHAT DIFFICULT
SUM OF ALL RESPONSES TO PHQ QUESTIONS 1-9: 8
SUM OF ALL RESPONSES TO PHQ QUESTIONS 1-9: 8

## 2022-06-09 NOTE — PROGRESS NOTES
Assessment & Plan     (F32.1) Moderate major depression (H)  Comment: Depression has been stable on his Risperdal  Plan: risperiDONE (RISPERDAL) 1 MG tablet        refills were sent for the year.    (R07.89) Other chest pain  Comment: He has been having some recurrent chest pain and actually has been seen in the emergency department for that.  His last time was in July 2020.  His work-up at that time was negative.  Plan: Echocardiogram Exercise Stress        He has had a couple episodes since most recently just within the last few weeks while he was at work.  He is a non-smoker but does have risk factors being a male in his late 50s with morbid obesity.  We will get him scheduled for an echocardiogram stress test to rule out ischemic disease.    (G47.09) Other insomnia  Comment: He has not been sleeping well and does not use Ambien during the week because he sleeps through his alarm if he does.  Plan: hydrOXYzine (ATARAX) 25 MG tablet        we talked about other options including hydroxyzine 25 mg tablets.  He can take 1 to 4 tablets for sleep at bedtime.  He is going to try to take this about 1/2 to 1-hour prior to going to bed.    (E66.01) Morbid obesity (H)  Comment: He is down 10 pounds since the last time I saw him a little over a year ago but definitely needs to lose more weight.  He does not eat well and eats a lot of frozen prepared foods.  Plan: I talked about trying to eat more whole foods including fresh fruits and vegetables and getting out to do more exercise to increase his activity and metabolic rate.    (M77.11) Lateral epicondylitis, right elbow  Comment: Lateral epicondylitis consistent with overuse.  Plan: I fitted him with a tennis elbow strap today and showed him how to wear it so that it takes pressure off the insertion site of the tendons and distributes that lower on the forearm.  He will wear this during any activity at either work or home where he is experiencing some discomfort.  If  this does not settle down to the point where he is able to recover from this then we may need to consider doing an injection.    (Z11.4) Screening for HIV (human immunodeficiency virus)  Comment: Patient is agreeable to the CDC recommendations for HIV screening  Plan: HIV Antigen Antibody Combo        drawn today.    (Z12.11) Screen for colon cancer  Comment: He is due for colon cancer screening  Plan: COLOGUARD([employer])        we talked about the options and he is chosen Cologuard as his screening tool.    (Z13.220) Screening for hyperlipidemia  Comment: He is due for lipid screening and has been fasting almost 6 hours.  Plan: Lipid panel reflex to direct LDL Fasting        drawn today.    (Z23) Need for shingles vaccine  Comment: Patient needs a second zoster vaccine  Plan: CANCELED: ZOSTER VACCINE RECOMBINANT ADJUVANTED        IM NJX        this was given today.    (Z13.1) Screening for diabetes mellitus  Comment: Due to his weight and inactivity he is at risk for type 2 diabetes  Plan: Comprehensive metabolic panel (BMP + Alb, Alk         Phos, ALT, AST, Total. Bili, TP)        labs were obtained today.      Review of prior external note(s) from - [facility] information from [facility] and His ER visit for chest pain. reviewed  45 minutes spent on the date of the encounter doing chart review, history and exam, documentation and further activities per the note    No follow-ups on file.    David Sheehan MD, MD  [facility] is a 57 year old who presents for the following health issues  accompanied by his daughter.    History of Present Illness       Reason for visit:  Check up and heart problems and leg swelling and arm    He eats 0-1 servings of fruits and vegetables daily.He consumes 0 sweetened beverage(s) daily.He exercises with enough effort to increase his heart rate 20 to 29 minutes per day.  He exercises with enough effort to increase his heart rate 5  days per week.   He is taking medications regularly.    Today's PHQ-9         PHQ-9 Total Score: 8    PHQ-9 Q9 Thoughts of better off dead/self-harm past 2 weeks :   Not at all    How difficult have these problems made it for you to do your work, take care of things at home, or get along with other people: Somewhat difficult     Overall patient is doing well he states that his depression has been pretty well controlled on the Risperdal.  His biggest problem is that he does not sleep well.  Ambien works well for him but it makes him sleep so well that he misses his alarm in the morning so only takes it on weekends when he is not working.  He just cannot seem to get his brain to shut down at nighttime when he is not using something to help him fall asleep.    Patient has a couple other issues.  He has had some peripheral edema and a few weeks ago has had swelling of his left leg and it was difficult for him to get his pants off.  He did not have any redness or warmth to it and that resolved within a day.  He does get some swelling bilaterally but it seems to be more equal.  This is worse at the end of the day.  He denies shortness of breath but he is quite deconditioned.  He has had episodes of chest pain over the last month most recently within the last couple weeks while he was at work.  He had a similar episode like this back in  and was brought to the emergency department at [facility].  He did not rule in for heart disease at that time but had no further follow-up.  His dad lived into his mid to late 90s and his mother  of a brain tumor that was cancerous.  He has 1 sister that  of unintentional overdose and a sister with breast cancer but no significant heart history.  He does not exercise on a regular basis and eats a lot of processed foods that are frozen for packaged that he can just pop in the microwave.  He does not eat a lot of fresh fruits and vegetables.  He lives in an apartment building  with him in the area so does not get out a lot of exercising.    Next issue is that of right elbow pain.  He has pain with even grasping a mug of coffee or when he lifts things.  It is on the lateral aspect of the elbow and seems consistent with lateral epicondylitis.  He has had no known injury at work or home.    Is long overdue for some health maintenance issues and is agreeable to doing some colon cancer screening, labs, and updated vaccinations.  He needs a second shingles vaccine.    Review of Systems   Constitutional, HEENT, cardiovascular, pulmonary, gi and gu systems are negative, except as otherwise noted.      Objective    /78   Pulse 60   Temp 97.7  F (36.5  C) (Temporal)   Resp 14   Wt 120.2 kg (265 lb)   SpO2 96%   BMI 40.29 kg/m    Body mass index is 40.29 kg/m .  Physical Exam   GENERAL: healthy, alert and no distress  EYES: Eyes grossly normal to inspection, PERRL and conjunctivae and sclerae normal  HENT: ear canals and TM's normal, nose and mouth without ulcers or lesions  NECK: no adenopathy, no asymmetry, masses, or scars and thyroid normal to palpation  RESP: lungs clear to auscultation - no rales, rhonchi or wheezes  CV: regular rate and rhythm, normal S1 S2, no S3 or S4, no murmur, click or rub, no peripheral edema and peripheral pulses strong  ABDOMEN: Abdomen is obese soft and nontender throughout.  I cannot appreciate any masses but he has a very large abdomen so is a difficult exam.   (male): Not indicated  RECTAL: Not indicated  MS: Patient has point tenderness over the radial head consistent with lateral epicondylitis.  He has pain with resistance to extension and even with grasping.  SKIN: no suspicious lesions or rashes  NEURO: Normal strength and tone, mentation intact and speech normal  PSYCH: mentation appears normal, affect normal/bright    Results for orders placed or performed in visit on 06/09/22 (from the past 24 hour(s))   Lipid panel reflex to direct LDL  Fasting   Result Value Ref Range    Cholesterol 195 <200 mg/dL    Triglycerides 119 <150 mg/dL    Direct Measure HDL 55 >=40 mg/dL    LDL Cholesterol Calculated 116 (H) <=100 mg/dL    Non HDL Cholesterol 140 (H) <130 mg/dL    Patient Fasting > 8hrs? No     Narrative    Cholesterol  Desirable:  <200 mg/dL    Triglycerides  Normal:  Less than 150 mg/dL  Borderline High:  150-199 mg/dL  High:  200-499 mg/dL  Very High:  Greater than or equal to 500 mg/dL    Direct Measure HDL  Female:  Greater than or equal to 50 mg/dL   Male:  Greater than or equal to 40 mg/dL    LDL Cholesterol  Desirable:  <100mg/dL  Above Desirable:  100-129 mg/dL   Borderline High:  130-159 mg/dL   High:  160-189 mg/dL   Very High:  >= 190 mg/dL    Non HDL Cholesterol  Desirable:  130 mg/dL  Above Desirable:  130-159 mg/dL  Borderline High:  160-189 mg/dL  High:  190-219 mg/dL  Very High:  Greater than or equal to 220 mg/dL   Comprehensive metabolic panel (BMP + Alb, Alk Phos, ALT, AST, Total. Bili, TP)   Result Value Ref Range    Sodium 138 133 - 144 mmol/L    Potassium 4.0 3.4 - 5.3 mmol/L    Chloride 105 94 - 109 mmol/L    Carbon Dioxide (CO2) 30 20 - 32 mmol/L    Anion Gap 3 3 - 14 mmol/L    Urea Nitrogen 11 7 - 30 mg/dL    Creatinine 0.77 0.66 - 1.25 mg/dL    Calcium 8.5 8.5 - 10.1 mg/dL    Glucose 119 (H) 70 - 99 mg/dL    Alkaline Phosphatase 70 40 - 150 U/L    AST 31 0 - 45 U/L    ALT 77 (H) 0 - 70 U/L    Protein Total 7.8 6.8 - 8.8 g/dL    Albumin 3.9 3.4 - 5.0 g/dL    Bilirubin Total 1.6 (H) 0.2 - 1.3 mg/dL    GFR Estimate >90 >60 mL/min/1.73m2

## 2022-06-10 LAB — HIV 1+2 AB+HIV1 P24 AG SERPL QL IA: NONREACTIVE

## 2022-06-13 ENCOUNTER — HOSPITAL ENCOUNTER (OUTPATIENT)
Dept: CARDIOLOGY | Facility: CLINIC | Age: 57
Discharge: HOME | End: 2022-06-13
Attending: FAMILY MEDICINE | Admitting: FAMILY MEDICINE
Payer: COMMERCIAL

## 2022-06-13 DIAGNOSIS — R07.89 OTHER CHEST PAIN: ICD-10-CM

## 2022-06-13 PROCEDURE — 255N000002 HC RX 255 OP 636: Performed by: FAMILY MEDICINE

## 2022-06-13 PROCEDURE — 93321 DOPPLER ECHO F-UP/LMTD STD: CPT | Mod: 26 | Performed by: INTERNAL MEDICINE

## 2022-06-13 PROCEDURE — 93350 STRESS TTE ONLY: CPT | Mod: 26 | Performed by: INTERNAL MEDICINE

## 2022-06-13 PROCEDURE — 93018 CV STRESS TEST I&R ONLY: CPT | Performed by: INTERNAL MEDICINE

## 2022-06-13 PROCEDURE — 93321 DOPPLER ECHO F-UP/LMTD STD: CPT | Mod: TC

## 2022-06-13 PROCEDURE — 93325 DOPPLER ECHO COLOR FLOW MAPG: CPT | Mod: 26 | Performed by: INTERNAL MEDICINE

## 2022-06-13 PROCEDURE — 93016 CV STRESS TEST SUPVJ ONLY: CPT | Performed by: INTERNAL MEDICINE

## 2022-06-13 RX ADMIN — HUMAN ALBUMIN MICROSPHERES AND PERFLUTREN 2 ML: 10; .22 INJECTION, SOLUTION INTRAVENOUS at 13:14

## 2022-07-18 ENCOUNTER — DOCUMENTATION ONLY (OUTPATIENT)
Dept: FAMILY MEDICINE | Facility: CLINIC | Age: 57
End: 2022-07-18

## 2022-07-18 DIAGNOSIS — M77.11 RIGHT TENNIS ELBOW: Primary | ICD-10-CM

## 2022-07-26 NOTE — PROGRESS NOTES
DME order was printed and signed and in Ngoc's basket.    Electronically signed by:  David Sheehan M.D.  7/25/2022

## 2022-07-30 ENCOUNTER — HEALTH MAINTENANCE LETTER (OUTPATIENT)
Age: 57
End: 2022-07-30

## 2022-10-09 ENCOUNTER — HEALTH MAINTENANCE LETTER (OUTPATIENT)
Age: 57
End: 2022-10-09

## 2022-12-14 DIAGNOSIS — G47.09 OTHER INSOMNIA: ICD-10-CM

## 2022-12-16 RX ORDER — ZOLPIDEM TARTRATE 10 MG/1
TABLET ORAL
Qty: 30 TABLET | Refills: 5 | Status: SHIPPED | OUTPATIENT
Start: 2022-12-16 | End: 2023-07-18

## 2023-07-13 DIAGNOSIS — G47.09 OTHER INSOMNIA: ICD-10-CM

## 2023-07-13 DIAGNOSIS — F32.1 MODERATE MAJOR DEPRESSION (H): ICD-10-CM

## 2023-07-17 NOTE — TELEPHONE ENCOUNTER
Pending Prescriptions:                       Disp   Refills    zolpidem (AMBIEN) 10 MG tablet [Pharmacy M*30 tab*0        Sig: TAKE 1 TABLET BY MOUTH AT BEDTIME AS NEEDED FOR SLEEP    risperiDONE (RISPERDAL) 1 MG tablet [Pharm*60 tab*0        Sig: Take 1 tablet by mouth twice daily      Routing refill request to provider for review/approval because:  Drug not on the [facility] refill protocol     Chana Villalpando RN on 7/17/2023 at 10:25 AM

## 2023-07-18 ENCOUNTER — MYC MEDICAL ADVICE (OUTPATIENT)
Dept: FAMILY MEDICINE | Facility: CLINIC | Age: 58
End: 2023-07-18
Payer: COMMERCIAL

## 2023-07-18 RX ORDER — RISPERIDONE 1 MG/1
TABLET ORAL
Qty: 180 TABLET | Refills: 0 | Status: SHIPPED | OUTPATIENT
Start: 2023-07-18 | End: 2023-12-07

## 2023-07-18 RX ORDER — ZOLPIDEM TARTRATE 10 MG/1
TABLET ORAL
Qty: 30 TABLET | Refills: 3 | Status: SHIPPED | OUTPATIENT
Start: 2023-07-18

## 2023-07-18 NOTE — LETTER
[address] [address]  [phone]        July 25, 2023    Krishna Diaz  [address] [address] [place] [address]  [facility] [id number]          Dear Krishna,    We are concerned about your health care.  We recently provided you with a medication refill.  Many medications require routine follow-up with your Doctor.       At this time we ask that: You schedule an appointment for your annual physical. Call the clinic at [phone] Option 1 to schedule.      Your prescription:  Has been filled. Please schedule a follow up visit for first available appointment. Courtesy refills will be given if needed until your scheduled appointment.     Sincerely,    Care Team

## 2023-08-19 ENCOUNTER — HEALTH MAINTENANCE LETTER (OUTPATIENT)
Age: 58
End: 2023-08-19

## 2023-12-07 ENCOUNTER — TELEPHONE (OUTPATIENT)
Dept: FAMILY MEDICINE | Facility: CLINIC | Age: 58
End: 2023-12-07
Payer: COMMERCIAL

## 2023-12-07 DIAGNOSIS — F32.1 MODERATE MAJOR DEPRESSION (H): ICD-10-CM

## 2023-12-07 RX ORDER — RISPERIDONE 1 MG/1
TABLET ORAL
Qty: 180 TABLET | Refills: 0 | Status: SHIPPED | OUTPATIENT
Start: 2023-12-07

## 2023-12-07 NOTE — TELEPHONE ENCOUNTER
Patient is due for an office visit and his yearly exam prior to further refills.  Please schedule an appointment with me or another provider.    Electronically signed by:  David Sheehan M.D.  12/7/2023

## 2023-12-07 NOTE — LETTER
December 12, 2023      Krishna Diaz  [address] [address] [place] [address]   [facility] [id number]        Dear Krishna,     We are concerned about your health care.  We recently provided you with a medication refill.  Many medications require routine follow-up with your Doctor.      At this time we ask that:  Patient is due for an office visit and his yearly exam prior to further refills.  Call the clinic at [phone] Option 1 to schedule.     Your prescription:  Has been filled. Please schedule a follow up visit for first available appointment. Courtesy refills will be given if needed until your scheduled appointment.       Thank you,   David Sheehan MD/ Care Team

## 2024-04-19 NOTE — TELEPHONE ENCOUNTER
Patient called asking for a medication refill of a prescription that looks to have been discontinued.  metoCLOPramide (REGLAN) 10 MG tablet patient states that she is almost out of medication and it was last refilled in September 2023 for a 90 day supply.  Pharmacy: [facility] [place]   Patient is asking for a return call to the above number.  Please Advise   Requested Prescriptions   Pending Prescriptions Disp Refills     zolpidem (AMBIEN) 10 MG tablet [Pharmacy Med Name: ZOLPIDEM 10MG       TAB] 30 tablet 6     Sig: TAKE ONE TABLET BY MOUTH AT BEDTIME AS NEEDED FOR SLEEP    There is no refill protocol information for this order        Last Written Prescription Date:  09/20/2017  Last Fill Quantity: 31,  # refills: 5   Last office visit: 9/20/2017 with prescribing provider:  09/20/2017   Future Office Visit:

## 2024-08-12 ENCOUNTER — APPOINTMENT (OUTPATIENT)
Dept: GENERAL RADIOLOGY | Facility: CLINIC | Age: 59
End: 2024-08-12
Attending: STUDENT IN AN ORGANIZED HEALTH CARE EDUCATION/TRAINING PROGRAM
Payer: COMMERCIAL

## 2024-08-12 ENCOUNTER — HOSPITAL ENCOUNTER (EMERGENCY)
Facility: CLINIC | Age: 59
Discharge: HOME | End: 2024-08-13
Attending: STUDENT IN AN ORGANIZED HEALTH CARE EDUCATION/TRAINING PROGRAM | Admitting: STUDENT IN AN ORGANIZED HEALTH CARE EDUCATION/TRAINING PROGRAM
Payer: COMMERCIAL

## 2024-08-12 DIAGNOSIS — R06.2 WHEEZING: ICD-10-CM

## 2024-08-12 LAB
ANION GAP SERPL CALCULATED.3IONS-SCNC: 10 MMOL/L (ref 7–15)
BASOPHILS # BLD AUTO: 0.1 10E3/UL (ref 0–0.2)
BASOPHILS NFR BLD AUTO: 1 %
BUN SERPL-MCNC: 11.7 MG/DL (ref 8–23)
CALCIUM SERPL-MCNC: 9 MG/DL (ref 8.8–10.4)
CHLORIDE SERPL-SCNC: 103 MMOL/L (ref 98–107)
CREAT SERPL-MCNC: 0.75 MG/DL (ref 0.67–1.17)
EGFRCR SERPLBLD CKD-EPI 2021: >90 ML/MIN/1.73M2
EOSINOPHIL # BLD AUTO: 0.5 10E3/UL (ref 0–0.7)
EOSINOPHIL NFR BLD AUTO: 6 %
ERYTHROCYTE [DISTWIDTH] IN BLOOD BY AUTOMATED COUNT: 12 % (ref 10–15)
FLUAV RNA SPEC QL NAA+PROBE: NEGATIVE
FLUBV RNA RESP QL NAA+PROBE: NEGATIVE
GLUCOSE SERPL-MCNC: 128 MG/DL (ref 70–99)
HCO3 SERPL-SCNC: 27 MMOL/L (ref 22–29)
HCT VFR BLD AUTO: 43.3 % (ref 40–53)
HGB BLD-MCNC: 15.2 G/DL (ref 13.3–17.7)
IMM GRANULOCYTES # BLD: 0 10E3/UL
IMM GRANULOCYTES NFR BLD: 1 %
LYMPHOCYTES # BLD AUTO: 2.5 10E3/UL (ref 0.8–5.3)
LYMPHOCYTES NFR BLD AUTO: 29 %
MCH RBC QN AUTO: 31.5 PG (ref 26.5–33)
MCHC RBC AUTO-ENTMCNC: 35.1 G/DL (ref 31.5–36.5)
MCV RBC AUTO: 90 FL (ref 78–100)
MONOCYTES # BLD AUTO: 1.1 10E3/UL (ref 0–1.3)
MONOCYTES NFR BLD AUTO: 12 %
NEUTROPHILS # BLD AUTO: 4.4 10E3/UL (ref 1.6–8.3)
NEUTROPHILS NFR BLD AUTO: 52 %
NRBC # BLD AUTO: 0 10E3/UL
NRBC BLD AUTO-RTO: 0 /100
NT-PROBNP SERPL-MCNC: <36 PG/ML (ref 0–900)
PLATELET # BLD AUTO: 217 10E3/UL (ref 150–450)
POTASSIUM SERPL-SCNC: 3.9 MMOL/L (ref 3.4–5.3)
RBC # BLD AUTO: 4.82 10E6/UL (ref 4.4–5.9)
RSV RNA SPEC NAA+PROBE: NEGATIVE
SARS-COV-2 RNA RESP QL NAA+PROBE: NEGATIVE
SODIUM SERPL-SCNC: 140 MMOL/L (ref 135–145)
TROPONIN T SERPL HS-MCNC: 19 NG/L
WBC # BLD AUTO: 8.6 10E3/UL (ref 4–11)

## 2024-08-12 PROCEDURE — 250N000009 HC RX 250: Performed by: STUDENT IN AN ORGANIZED HEALTH CARE EDUCATION/TRAINING PROGRAM

## 2024-08-12 PROCEDURE — 83880 ASSAY OF NATRIURETIC PEPTIDE: CPT | Performed by: STUDENT IN AN ORGANIZED HEALTH CARE EDUCATION/TRAINING PROGRAM

## 2024-08-12 PROCEDURE — 84484 ASSAY OF TROPONIN QUANT: CPT | Performed by: STUDENT IN AN ORGANIZED HEALTH CARE EDUCATION/TRAINING PROGRAM

## 2024-08-12 PROCEDURE — 99285 EMERGENCY DEPT VISIT HI MDM: CPT | Mod: 25 | Performed by: STUDENT IN AN ORGANIZED HEALTH CARE EDUCATION/TRAINING PROGRAM

## 2024-08-12 PROCEDURE — 93010 ELECTROCARDIOGRAM REPORT: CPT | Performed by: STUDENT IN AN ORGANIZED HEALTH CARE EDUCATION/TRAINING PROGRAM

## 2024-08-12 PROCEDURE — 87637 SARSCOV2&INF A&B&RSV AMP PRB: CPT | Performed by: STUDENT IN AN ORGANIZED HEALTH CARE EDUCATION/TRAINING PROGRAM

## 2024-08-12 PROCEDURE — 94640 AIRWAY INHALATION TREATMENT: CPT

## 2024-08-12 PROCEDURE — 93005 ELECTROCARDIOGRAM TRACING: CPT | Performed by: STUDENT IN AN ORGANIZED HEALTH CARE EDUCATION/TRAINING PROGRAM

## 2024-08-12 PROCEDURE — 36415 COLL VENOUS BLD VENIPUNCTURE: CPT | Performed by: STUDENT IN AN ORGANIZED HEALTH CARE EDUCATION/TRAINING PROGRAM

## 2024-08-12 PROCEDURE — 99284 EMERGENCY DEPT VISIT MOD MDM: CPT | Performed by: STUDENT IN AN ORGANIZED HEALTH CARE EDUCATION/TRAINING PROGRAM

## 2024-08-12 PROCEDURE — 96375 TX/PRO/DX INJ NEW DRUG ADDON: CPT | Performed by: STUDENT IN AN ORGANIZED HEALTH CARE EDUCATION/TRAINING PROGRAM

## 2024-08-12 PROCEDURE — 80048 BASIC METABOLIC PNL TOTAL CA: CPT | Performed by: STUDENT IN AN ORGANIZED HEALTH CARE EDUCATION/TRAINING PROGRAM

## 2024-08-12 PROCEDURE — 71046 X-RAY EXAM CHEST 2 VIEWS: CPT

## 2024-08-12 PROCEDURE — 250N000011 HC RX IP 250 OP 636: Performed by: STUDENT IN AN ORGANIZED HEALTH CARE EDUCATION/TRAINING PROGRAM

## 2024-08-12 PROCEDURE — 96374 THER/PROPH/DIAG INJ IV PUSH: CPT | Performed by: STUDENT IN AN ORGANIZED HEALTH CARE EDUCATION/TRAINING PROGRAM

## 2024-08-12 PROCEDURE — 85025 COMPLETE CBC W/AUTO DIFF WBC: CPT | Performed by: STUDENT IN AN ORGANIZED HEALTH CARE EDUCATION/TRAINING PROGRAM

## 2024-08-12 RX ORDER — ALBUTEROL SULFATE 90 UG/1
2 AEROSOL, METERED RESPIRATORY (INHALATION) EVERY 6 HOURS PRN
Qty: 18 G | Refills: 0 | Status: SHIPPED | OUTPATIENT
Start: 2024-08-12

## 2024-08-12 RX ORDER — METHYLPREDNISOLONE 4 MG
TABLET, DOSE PACK ORAL
Qty: 21 TABLET | Refills: 0 | Status: SHIPPED | OUTPATIENT
Start: 2024-08-12

## 2024-08-12 RX ORDER — METHYLPREDNISOLONE SODIUM SUCCINATE 125 MG/2ML
125 INJECTION, POWDER, LYOPHILIZED, FOR SOLUTION INTRAMUSCULAR; INTRAVENOUS ONCE
Status: COMPLETED | OUTPATIENT
Start: 2024-08-12 | End: 2024-08-12

## 2024-08-12 RX ORDER — IPRATROPIUM BROMIDE AND ALBUTEROL SULFATE 2.5; .5 MG/3ML; MG/3ML
3 SOLUTION RESPIRATORY (INHALATION) ONCE
Status: COMPLETED | OUTPATIENT
Start: 2024-08-12 | End: 2024-08-12

## 2024-08-12 RX ORDER — AZITHROMYCIN 250 MG/1
TABLET, FILM COATED ORAL
Qty: 6 TABLET | Refills: 0 | Status: SHIPPED | OUTPATIENT
Start: 2024-08-12 | End: 2024-08-17

## 2024-08-12 RX ADMIN — IPRATROPIUM BROMIDE AND ALBUTEROL SULFATE 3 ML: .5; 3 SOLUTION RESPIRATORY (INHALATION) at 23:30

## 2024-08-12 RX ADMIN — IPRATROPIUM BROMIDE AND ALBUTEROL SULFATE 3 ML: .5; 3 SOLUTION RESPIRATORY (INHALATION) at 23:46

## 2024-08-12 RX ADMIN — IPRATROPIUM BROMIDE AND ALBUTEROL SULFATE 3 ML: .5; 3 SOLUTION RESPIRATORY (INHALATION) at 21:58

## 2024-08-12 RX ADMIN — METHYLPREDNISOLONE SODIUM SUCCINATE 125 MG: 125 INJECTION, POWDER, FOR SOLUTION INTRAMUSCULAR; INTRAVENOUS at 23:30

## 2024-08-12 ASSESSMENT — ACTIVITIES OF DAILY LIVING (ADL)
ADLS_ACUITY_SCORE: 35
ADLS_ACUITY_SCORE: 35

## 2024-08-12 ASSESSMENT — ENCOUNTER SYMPTOMS
WHEEZING: 1
ABDOMINAL PAIN: 0
SHORTNESS OF BREATH: 1
FEVER: 0
COUGH: 0

## 2024-08-12 ASSESSMENT — COLUMBIA-SUICIDE SEVERITY RATING SCALE - C-SSRS
2. HAVE YOU ACTUALLY HAD ANY THOUGHTS OF KILLING YOURSELF IN THE PAST MONTH?: NO
6. HAVE YOU EVER DONE ANYTHING, STARTED TO DO ANYTHING, OR PREPARED TO DO ANYTHING TO END YOUR LIFE?: NO
1. IN THE PAST MONTH, HAVE YOU WISHED YOU WERE DEAD OR WISHED YOU COULD GO TO SLEEP AND NOT WAKE UP?: NO

## 2024-08-12 NOTE — LETTER
August 13, 2024      To Whom It May Concern:      Krishna Diaz was seen in our Emergency Department today, 08/13/24.  I expect his condition to improve over the next 4 days.  He may return to work/school when improved.    Sincerely,        Esteban Barrera MD

## 2024-08-13 VITALS
WEIGHT: 270 LBS | SYSTOLIC BLOOD PRESSURE: 157 MMHG | HEIGHT: 68 IN | BODY MASS INDEX: 40.92 KG/M2 | OXYGEN SATURATION: 91 % | HEART RATE: 69 BPM | DIASTOLIC BLOOD PRESSURE: 74 MMHG | RESPIRATION RATE: 18 BRPM | TEMPERATURE: 98.5 F

## 2024-08-13 NOTE — ED PROVIDER NOTES
History     Chief Complaint   Patient presents with    Shortness of Breath     HPI  Krishna Diaz is a 59 year old male who presents with shortness of breath that started around 3:00 this afternoon.  He denies a history of asthma or COPD and denies any history of smoking.  He does have some mild lower leg swelling that been chronic for years and has not recently changed.  He does suffer from morbid obesity various mood disorders and is well as the restless leg syndrome but notes that this is all been stable.  He denies any runny nose congestion chest pain or pressure aside from the wheeze and feeling of shortness of breath.  He has not any have any radiation to his jaw back or arms.  He has no belly pains or changes in urinary or stooling habits.  He does not take any medications for    Allergies:  Allergies   Allergen Reactions    No Known Drug Allergy        Problem List:    Patient Active Problem List    Diagnosis Date Noted    Morbid obesity (H) 06/09/2022     Priority: Medium    Lateral epicondylitis, right elbow 06/09/2022     Priority: Medium    Other schizophrenia (H) 01/29/2019     Priority: Medium    Right inguinal hernia 11/06/2014     Priority: Medium    Right testicular pain 11/06/2014     Priority: Medium    Anxiety 09/20/2012     Priority: Medium    Generalized anxiety disorder 09/05/2012     Priority: Medium     Diagnosis updated by automated process. Provider to review and confirm.      Suicidal ideation 06/29/2012     Priority: Medium    Moderate major depression (H) 06/04/2012     Priority: Medium    ADHD (attention deficit hyperactivity disorder) 05/30/2012     Priority: Medium    HYPERLIPIDEMIA LDL GOAL <130 10/31/2010     Priority: Medium    Restless leg 03/05/2009     Priority: Medium    Other specific developmental learning difficulties 09/03/2004     Priority: Medium        Past Medical History:    Past Medical History:   Diagnosis Date    ADD (attention deficit disorder)     Mixed  "hyperlipidemia 6/25/2004    OBESITY NOS 9/3/2004    OTHER LEARNING DIFFICULTY 9/3/2004    Restless leg 3/5/2009    Severe Depression [296.33] 5/17/2009       Past Surgical History:    Past Surgical History:   Procedure Laterality Date    REMOVAL OF SPERM DUCT(S)      ZZHC COLONOSCOPY W BIOPSY  3/8/2006       Family History:    Family History   Problem Relation Age of Onset    Depression Mother         suicide attempts x 2    Cancer Mother         brain tumor    Other - See Comments Father 89    Other - See Comments Sister 57        overdosed from pain pills accidentally    Psychotic Disorder Son         ADD    Psychotic Disorder Daughter         ADD       Social History:  Marital Status:   [4]  Social History     Tobacco Use    Smoking status: Never    Smokeless tobacco: Never   Vaping Use    Vaping status: Never Used   Substance Use Topics    Alcohol use: No    Drug use: No        Medications:    albuterol (PROAIR HFA/PROVENTIL HFA/VENTOLIN HFA) 108 (90 Base) MCG/ACT inhaler  albuterol (PROAIR HFA/PROVENTIL HFA/VENTOLIN HFA) 108 (90 Base) MCG/ACT inhaler  azithromycin (ZITHROMAX) 250 MG tablet  methylPREDNISolone (MEDROL DOSEPAK) 4 MG tablet therapy pack  fexofenadine (ALLEGRA) 180 MG tablet  hydrOXYzine (ATARAX) 25 MG tablet  ibuprofen (ADVIL/MOTRIN) 200 MG tablet  Multiple Vitamin (MULTI-VITAMIN) per tablet  risperiDONE (RISPERDAL) 1 MG tablet  Tea Tree Oil (NO FUNGUS NAIL PROTECTANT EX)  zolpidem (AMBIEN) 10 MG tablet          Review of Systems   Constitutional:  Negative for fever.   Respiratory:  Positive for shortness of breath and wheezing. Negative for cough.    Cardiovascular:  Negative for chest pain.   Gastrointestinal:  Negative for abdominal pain.   Skin:  Negative for rash.   All other systems reviewed and are negative.      Physical Exam   BP: (!) 215/118  Pulse: 69  Temp: 98.5  F (36.9  C)  Resp: 18  Height: 172.7 cm (5' 8\")  Weight: 122.5 kg (270 lb)  SpO2: 94 %      Physical Exam  Vitals " and nursing note reviewed.   Constitutional:       General: He is not in acute distress.     Appearance: He is well-developed. He is obese. He is not ill-appearing, toxic-appearing or diaphoretic.   HENT:      Head: Atraumatic.      Mouth/Throat:      Mouth: Mucous membranes are moist.      Pharynx: Oropharynx is clear.   Eyes:      Pupils: Pupils are equal, round, and reactive to light.   Cardiovascular:      Rate and Rhythm: Normal rate and regular rhythm.   Pulmonary:      Effort: Pulmonary effort is normal.      Breath sounds: Examination of the right-upper field reveals wheezing. Examination of the left-upper field reveals wheezing. Examination of the right-middle field reveals wheezing. Examination of the left-middle field reveals wheezing. Examination of the right-lower field reveals wheezing. Examination of the left-lower field reveals wheezing. Wheezing present. No decreased breath sounds, rhonchi or rales.   Abdominal:      General: Bowel sounds are normal.      Palpations: Abdomen is soft.   Musculoskeletal:         General: Normal range of motion.      Right lower leg: Edema (mild) present.      Left lower leg: Edema (mild) present.   Skin:     General: Skin is warm and dry.      Capillary Refill: Capillary refill takes less than 2 seconds.      Coloration: Skin is not cyanotic.      Findings: No rash.   Neurological:      General: No focal deficit present.      Mental Status: He is alert.         ED Course        Procedures         EKG self interpreted at 10:04 PM.  Sinus bradycardia at a rate of 52 bpm, PA interval body prolonged at 206, QTc of 398 and a QRS of 82.  No axis deviation bundle branch blocks ST segment elevations depressions or ectopic beats.  Normal EKG     Critical Care time:  was 30 minutes for this patient excluding procedures.         Results for orders placed or performed during the hospital encounter of 08/12/24 (from the past 24 hour(s))   CBC with platelets differential     Narrative    The following orders were created for panel order CBC with platelets differential.  Procedure                               Abnormality         Status                     ---------                               -----------         ------                     CBC with platelets and d...[[id number]]                      Final result                 Please view results for these tests on the individual orders.   Basic metabolic panel   Result Value Ref Range    Sodium 140 135 - 145 mmol/L    Potassium 3.9 3.4 - 5.3 mmol/L    Chloride 103 98 - 107 mmol/L    Carbon Dioxide (CO2) 27 22 - 29 mmol/L    Anion Gap 10 7 - 15 mmol/L    Urea Nitrogen 11.7 8.0 - 23.0 mg/dL    Creatinine 0.75 0.67 - 1.17 mg/dL    GFR Estimate >90 >60 mL/min/1.73m2    Calcium 9.0 8.8 - 10.4 mg/dL    Glucose 128 (H) 70 - 99 mg/dL   Troponin T, High Sensitivity   Result Value Ref Range    Troponin T, High Sensitivity 19 <=22 ng/L   Nt probnp inpatient (BNP)   Result Value Ref Range    N terminal Pro BNP Inpatient <36 0 - 900 pg/mL   CBC with platelets and differential   Result Value Ref Range    WBC Count 8.6 4.0 - 11.0 10e3/uL    RBC Count 4.82 4.40 - 5.90 10e6/uL    Hemoglobin 15.2 13.3 - 17.7 g/dL    Hematocrit 43.3 40.0 - 53.0 %    MCV 90 78 - 100 fL    MCH 31.5 26.5 - 33.0 pg    MCHC 35.1 31.5 - 36.5 g/dL    RDW 12.0 10.0 - 15.0 %    Platelet Count 217 150 - 450 10e3/uL    % Neutrophils 52 %    % Lymphocytes 29 %    % Monocytes 12 %    % Eosinophils 6 %    % Basophils 1 %    % Immature Granulocytes 1 %    NRBCs per 100 WBC 0 <1 /100    Absolute Neutrophils 4.4 1.6 - 8.3 10e3/uL    Absolute Lymphocytes 2.5 0.8 - 5.3 10e3/uL    Absolute Monocytes 1.1 0.0 - 1.3 10e3/uL    Absolute Eosinophils 0.5 0.0 - 0.7 10e3/uL    Absolute Basophils 0.1 0.0 - 0.2 10e3/uL    Absolute Immature Granulocytes 0.0 <=0.4 10e3/uL    Absolute NRBCs 0.0 10e3/uL   XR Chest 2 Views    Narrative    EXAM: XR CHEST 2 VIEWS  LOCATION: [facility]  [facility] CENTER  DATE: 8/12/2024    INDICATION: Bilateral wheezing. Lower leg swelling.  COMPARISON: None.      Impression    IMPRESSION: Negative chest.   Symptomatic Influenza A/B, RSV, & SARS-CoV2 PCR (COVID-19) Nasopharyngeal    Specimen: Nasopharyngeal; Swab   Result Value Ref Range    Influenza A PCR Negative Negative    Influenza B PCR Negative Negative    RSV PCR Negative Negative    SARS CoV2 PCR Negative Negative    Narrative    Testing was performed using the Xpert Xpress CoV2/Flu/RSV Assay on the [employer] GeneXpert Instrument. This test should be ordered for the detection of SARS-CoV-2, influenza, and RSV viruses in individuals who meet clinical and/or epidemiological criteria. Test performance is unknown in asymptomatic patients. This test is for in vitro diagnostic use under the FDA EUA for laboratories certified under CLIA to perform high or moderate complexity testing. This test has not been FDA cleared or approved. A negative result does not rule out the presence of PCR inhibitors in the specimen or target RNA in concentration below the limit of detection for the assay. If only one viral target is positive but coinfection with multiple targets is suspected, the sample should be re-tested with another FDA cleared, approved, or authorized test, if coinfection would change clinical management. This test was validated by the [facility] [employer] [employer]. These laboratories are certified under the Clinical Laboratory Improvement Amendments of 1988 (CLIA-88) as qualified to perform high complexity laboratory testing.       Medications   ipratropium - albuterol 0.5 mg/2.5 mg/3 mL (DUONEB) neb solution 3 mL (has no administration in time range)   ipratropium - albuterol 0.5 mg/2.5 mg/3 mL (DUONEB) neb solution 3 mL (3 mLs Nebulization $Given 8/12/24 6372)   ipratropium - albuterol 0.5 mg/2.5 mg/3 mL (DUONEB) neb solution 3 mL (3 mLs Nebulization $Given 8/12/24 2330)   methylPREDNISolone sodium succinate  (solu-MEDROL) injection 125 mg (125 mg Intravenous $Given 8/12/24 6823)       Assessments & Plan (with Medical Decision Making)     I have reviewed the nursing notes.    I have reviewed the findings, diagnosis, plan and need for follow up with the patient.      Medical Decision Making  Pleasant 59-year-old male presenting with shortness of breath and onset of wheeze around 2:00 today.  Wheezing gone all day and notified his daughter noted that he wanted him evaluated.  He is in no acute distress.  His heart rate is mildly bradycardic he has mild hypertension that is improved throughout the evaluation down to 150 over 90s.  No fever was noted.  Oxygen saturation has been stable at 94% down to 90% on room air.  Wheeze initially had mild improvement with first DuoNeb and had to complete dissipated after second DuoNeb.  Lab work was reassuring with a BNP of less than 36, troponin of 19 however with no chest pains nausea vomiting radiating pain or any symptoms without any EKG changes low concern for ACS at this time.  Influenza RSV and COVID-negative.  BMP and CBC unremarkable.  Chest x-ray without acute signs of pulm edema or pneumonia.  With significant improvement of his symptoms after DuoNeb treatment believe patient likely has some type of chronic bronchitis or an undiagnosed COPD.  Provided patient with methylprednisolone IV and a Medrol Dosepak Z-Andrea and albuterol.  Discussed return precautions with patient.  Discussed outpatient follow-up.  Discussed heart score currently at 2.  No other acute findings present concerning for acute heart failure, ACS, pneumonia however could be having a secondary viral exacerbation of an undiagnosed underlying lung pathology such as asthma or COPD.  Patient understands recommendations as well as daughter at bedside for return precautions outpatient follow-up measures and outpatient management plan.  Medications understood and again we discussed each medication before discharge.   Patient was opening up 1 more dose of DuoNeb prior to discharge.  This is provided for total of 3 doses.    New Prescriptions    ALBUTEROL (PROAIR HFA/PROVENTIL HFA/VENTOLIN HFA) 108 (90 BASE) MCG/ACT INHALER    Inhale 2 puffs into the lungs every 6 hours as needed for shortness of breath, wheezing or cough    ALBUTEROL (PROAIR HFA/PROVENTIL HFA/VENTOLIN HFA) 108 (90 BASE) MCG/ACT INHALER    Inhale 2 puffs into the lungs every 6 hours as needed for shortness of breath, wheezing or cough    AZITHROMYCIN (ZITHROMAX) 250 MG TABLET    Take 2 tablets (500 mg) by mouth daily for 1 day, THEN 1 tablet (250 mg) daily for 4 days.    METHYLPREDNISOLONE (MEDROL DOSEPAK) 4 MG TABLET THERAPY PACK    Follow Package Directions       Final diagnoses:   Wheezing       8/12/2024   [facility] EMERGENCY DEPT       Esteban Barrera MD  08/12/24 1995

## 2024-08-13 NOTE — ED TRIAGE NOTES
Onset 1500 wheezing and sob. Denies any coughing or fever. Denies hx copd or asthma.      Triage Assessment (Adult)       Row Name 08/12/24 4510          Triage Assessment    Airway WDL WDL        Respiratory WDL    Respiratory WDL X        Cardiac WDL    Cardiac WDL WDL

## 2024-08-13 NOTE — DISCHARGE INSTRUCTIONS
You are seen today for wheezing.  You had significant proved after 2 treatments of nebulizers.  At this time believe that you may be having undiagnosed bronchitis versus COPD versus asthma.  Due to your significant improvement.  And your lab work as well as imaging was otherwise unremarkable for any other contributing factors.  At this time would recommend a outside patient pulmonary function test.  Recommend following up with your primary care were in this regard to have this ordered.  Antibiotics steroids and inhalers been sent to your pharmacy.  Any other acute concerns arise or worsening of shortness of breath occurs please return for reevaluation.

## 2024-10-12 ENCOUNTER — HEALTH MAINTENANCE LETTER (OUTPATIENT)
Age: 59
End: 2024-10-12

## 2024-11-25 ENCOUNTER — OFFICE VISIT (OUTPATIENT)
Dept: FAMILY MEDICINE | Facility: CLINIC | Age: 59
End: 2024-11-25
Payer: COMMERCIAL

## 2024-11-25 VITALS
SYSTOLIC BLOOD PRESSURE: 184 MMHG | DIASTOLIC BLOOD PRESSURE: 92 MMHG | WEIGHT: 275 LBS | BODY MASS INDEX: 40.73 KG/M2 | OXYGEN SATURATION: 95 % | HEIGHT: 69 IN | HEART RATE: 69 BPM | TEMPERATURE: 98.2 F | RESPIRATION RATE: 28 BRPM

## 2024-11-25 DIAGNOSIS — F20.89 OTHER SCHIZOPHRENIA (H): ICD-10-CM

## 2024-11-25 DIAGNOSIS — F32.1 MODERATE MAJOR DEPRESSION (H): ICD-10-CM

## 2024-11-25 DIAGNOSIS — Z00.00 ROUTINE GENERAL MEDICAL EXAMINATION AT A HEALTH CARE FACILITY: Primary | ICD-10-CM

## 2024-11-25 DIAGNOSIS — F33.1 MODERATE EPISODE OF RECURRENT MAJOR DEPRESSIVE DISORDER (H): ICD-10-CM

## 2024-11-25 DIAGNOSIS — E66.01 MORBID OBESITY (H): ICD-10-CM

## 2024-11-25 DIAGNOSIS — Z12.5 SCREENING FOR PROSTATE CANCER: ICD-10-CM

## 2024-11-25 DIAGNOSIS — E78.5 HYPERLIPIDEMIA LDL GOAL <130: ICD-10-CM

## 2024-11-25 DIAGNOSIS — Z12.11 SCREEN FOR COLON CANCER: ICD-10-CM

## 2024-11-25 DIAGNOSIS — R73.09 ELEVATED GLUCOSE: ICD-10-CM

## 2024-11-25 DIAGNOSIS — I10 ESSENTIAL HYPERTENSION: ICD-10-CM

## 2024-11-25 LAB
ALBUMIN SERPL BCG-MCNC: 4.3 G/DL (ref 3.5–5.2)
ALP SERPL-CCNC: 82 U/L (ref 40–150)
ALT SERPL W P-5'-P-CCNC: 78 U/L (ref 0–70)
ANION GAP SERPL CALCULATED.3IONS-SCNC: 10 MMOL/L (ref 7–15)
AST SERPL W P-5'-P-CCNC: 37 U/L (ref 0–45)
BILIRUB SERPL-MCNC: 1.1 MG/DL
BUN SERPL-MCNC: 9 MG/DL (ref 8–23)
CALCIUM SERPL-MCNC: 9.4 MG/DL (ref 8.8–10.4)
CHLORIDE SERPL-SCNC: 103 MMOL/L (ref 98–107)
CHOLEST SERPL-MCNC: 204 MG/DL
CREAT SERPL-MCNC: 0.79 MG/DL (ref 0.67–1.17)
EGFRCR SERPLBLD CKD-EPI 2021: >90 ML/MIN/1.73M2
EST. AVERAGE GLUCOSE BLD GHB EST-MCNC: 108 MG/DL
FASTING STATUS PATIENT QL REPORTED: NO
FASTING STATUS PATIENT QL REPORTED: NO
GLUCOSE SERPL-MCNC: 96 MG/DL (ref 70–99)
HBA1C MFR BLD: 5.4 %
HCO3 SERPL-SCNC: 29 MMOL/L (ref 22–29)
HDLC SERPL-MCNC: 51 MG/DL
LDLC SERPL CALC-MCNC: 128 MG/DL
NONHDLC SERPL-MCNC: 153 MG/DL
POTASSIUM SERPL-SCNC: 4.5 MMOL/L (ref 3.4–5.3)
PROT SERPL-MCNC: 7.8 G/DL (ref 6.4–8.3)
PSA SERPL DL<=0.01 NG/ML-MCNC: 0.54 NG/ML (ref 0–4.5)
SODIUM SERPL-SCNC: 142 MMOL/L (ref 135–145)
TRIGL SERPL-MCNC: 123 MG/DL
TSH SERPL DL<=0.005 MIU/L-ACNC: 2.58 UIU/ML (ref 0.3–4.2)

## 2024-11-25 PROCEDURE — G0103 PSA SCREENING: HCPCS | Performed by: PHYSICIAN ASSISTANT

## 2024-11-25 PROCEDURE — 80061 LIPID PANEL: CPT | Performed by: PHYSICIAN ASSISTANT

## 2024-11-25 PROCEDURE — 80053 COMPREHEN METABOLIC PANEL: CPT | Performed by: PHYSICIAN ASSISTANT

## 2024-11-25 PROCEDURE — 84443 ASSAY THYROID STIM HORMONE: CPT | Performed by: PHYSICIAN ASSISTANT

## 2024-11-25 PROCEDURE — 99396 PREV VISIT EST AGE 40-64: CPT | Performed by: PHYSICIAN ASSISTANT

## 2024-11-25 PROCEDURE — 36415 COLL VENOUS BLD VENIPUNCTURE: CPT | Performed by: PHYSICIAN ASSISTANT

## 2024-11-25 PROCEDURE — 83036 HEMOGLOBIN GLYCOSYLATED A1C: CPT | Performed by: PHYSICIAN ASSISTANT

## 2024-11-25 PROCEDURE — 99214 OFFICE O/P EST MOD 30 MIN: CPT | Mod: 25 | Performed by: PHYSICIAN ASSISTANT

## 2024-11-25 RX ORDER — RISPERIDONE 1 MG/1
1 TABLET ORAL 2 TIMES DAILY
Qty: 180 TABLET | Refills: 4 | Status: CANCELLED | OUTPATIENT
Start: 2024-11-25

## 2024-11-25 SDOH — HEALTH STABILITY: PHYSICAL HEALTH: ON AVERAGE, HOW MANY MINUTES DO YOU ENGAGE IN EXERCISE AT THIS LEVEL?: 0 MIN

## 2024-11-25 SDOH — HEALTH STABILITY: PHYSICAL HEALTH: ON AVERAGE, HOW MANY DAYS PER WEEK DO YOU ENGAGE IN MODERATE TO STRENUOUS EXERCISE (LIKE A BRISK WALK)?: 5 DAYS

## 2024-11-25 ASSESSMENT — SOCIAL DETERMINANTS OF HEALTH (SDOH): HOW OFTEN DO YOU GET TOGETHER WITH FRIENDS OR RELATIVES?: ONCE A WEEK

## 2024-11-25 ASSESSMENT — PATIENT HEALTH QUESTIONNAIRE - PHQ9
10. IF YOU CHECKED OFF ANY PROBLEMS, HOW DIFFICULT HAVE THESE PROBLEMS MADE IT FOR YOU TO DO YOUR WORK, TAKE CARE OF THINGS AT HOME, OR GET ALONG WITH OTHER PEOPLE: NOT DIFFICULT AT ALL
SUM OF ALL RESPONSES TO PHQ QUESTIONS 1-9: 5
SUM OF ALL RESPONSES TO PHQ QUESTIONS 1-9: 5

## 2024-11-25 ASSESSMENT — PAIN SCALES - GENERAL: PAINLEVEL_OUTOF10: NO PAIN (0)

## 2024-11-25 NOTE — PROGRESS NOTES
Preventive Care Visit  [facility]  Camila Iglesias PA-C, Family Medicine  Nov 25, 2024        Soco Chambers is a 59 year old, presenting for the following:  Physical and Establish Care        11/25/2024     2:23 PM   Additional Questions   Roomed by Shanel         11/25/2024     2:23 PM   Patient Reported Additional Medications   Patient reports taking the following new medications none          HPI    Patient presents today for routine physical and to establish care. Blood pressure is quite elevated today. Upon chart review it looks like this ranges quite a bit over the last few years. He denies symptoms related aside from swelling in his legs. He does stand for his full shift at work. As long as he wears compression stockings this is better. Sleeps in a recliner with legs elevated.     Worries about his weight and possible diabetes. He does endorse having a poor diet with mainly just having foods that can be made in the microwave. States he does not know how to cook well.     He has been on Risperidone for many years - takes this only once daily. He does have a diagnosis of schizophrenia related to seeing shadows. Reports this still happens but is unchanged. Does feel the once daily dosing helps with his depression. He uses Ambien to sleep on the weekends. Too sedating during the week that he misses his alarm. Takes an OTC during the week which does help.     Has 1 daughter and 1 son.       Health Care Directive  Patient does not have a Health Care Directive: Discussed advance care planning with patient; information given to patient to review.      11/25/2024   General Health   How would you rate your overall physical health? (!) FAIR   Feel stress (tense, anxious, or unable to sleep) To some extent      (!) STRESS CONCERN      11/25/2024   Nutrition   Three or more servings of calcium each day? (!) I DON'T KNOW   Diet: I don't know   How many servings of fruit and vegetables  "per day? (!) 0-1   How many sweetened beverages each day? (!) 3            11/25/2024   Exercise   Days per week of moderate/strenous exercise 5 days   Average minutes spent exercising at this level 0 min            11/25/2024   Social Factors   Frequency of gathering with friends or relatives Once a week   Worry food won't last until get money to buy more No   Food not last or not have enough money for food? No   Do you have housing? (Housing is defined as stable permanent housing and does not include staying ouside in a car, in a tent, in an abandoned building, in an overnight shelter, or couch-surfing.) Yes   Are you worried about losing your housing? No   Lack of transportation? No   Unable to get utilities (heat,electricity)? No            11/25/2024   Fall Risk   Fallen 2 or more times in the past year? No    Trouble with walking or balance? No        Patient-reported          11/25/2024   Dental   Dentist two times every year? (!) NO            11/25/2024   TB Screening   Were you born outside of the [place]? No          Today's PHQ-9 Score:       11/25/2024     2:28 PM   PHQ-9 SCORE   PHQ-9 Total Score MyChart 5 (Mild depression)   PHQ-9 Total Score 5        Patient-reported         11/25/2024   Substance Use   Alcohol more than 3/day or more than 7/wk Not Applicable   Do you use any other substances recreationally? No        Social History     Tobacco Use    Smoking status: Never    Smokeless tobacco: Never   Vaping Use    Vaping status: Never Used   Substance Use Topics    Alcohol use: No    Drug use: No           11/25/2024   STI Screening   New sexual partner(s) since last STI/HIV test? No      Last PSA: No results found for: \"PSA\"  ASCVD Risk   The ASCVD Risk score (Desirae MONTANA, et al., 2019) failed to calculate for the following reasons:    The valid systolic blood pressure range is 90 to 200 mmHg         Reviewed and updated as needed this visit by Provider                      Review of " "Systems  Constitutional, HEENT, cardiovascular, pulmonary, GI, , musculoskeletal, neuro, skin, endocrine and psych systems are negative, except as otherwise noted.     Objective    Exam  BP (!) 206/99 (BP Location: Right arm, Patient Position: Sitting, Cuff Size: Adult Large)   Pulse 69   Temp 98.2  F (36.8  C) (Oral)   Resp 28   Ht 1.746 m (5' 8.74\")   Wt 124.7 kg (275 lb)   SpO2 95%   BMI 40.92 kg/m     Estimated body mass index is 40.92 kg/m  as calculated from the following:    Height as of this encounter: 1.746 m (5' 8.74\").    Weight as of this encounter: 124.7 kg (275 lb).    Physical Exam  GENERAL: alert and no distress  EYES: Eyes grossly normal to inspection, PERRL and conjunctivae and sclerae normal  HENT: ear canals and TM's normal, nose and mouth without ulcers or lesions  NECK: no adenopathy, no asymmetry, masses, or scars  RESP: lungs clear to auscultation - no rales, rhonchi or wheezes  CV: regular rate and rhythm, normal S1 S2, no S3 or S4, no murmur, click or rub, no peripheral edema  ABDOMEN: soft, nontender, no hepatosplenomegaly, no masses and bowel sounds normal  MS: no gross musculoskeletal defects noted, no edema  SKIN: no suspicious lesions or rashes  NEURO: Normal strength and tone, mentation intact and speech normal  PSYCH: mentation appears normal, affect normal/bright      Assessment & Plan     Routine general medical examination at a health care facility  Vaccinations reviewed and declined at this time.    Essential hypertension  Blood pressure quite high today x 2. Discussed likely need for medication but would like to get updated labs first as it has been many years. Likely will start something with a diuretic given his lower extremity swelling. Diet changes and weight loss advised.   - TSH with free T4 reflex; Future  - Comprehensive metabolic panel (BMP + Alb, Alk Phos, ALT, AST, Total. Bili, TP); Future  - TSH with free T4 reflex  - Comprehensive metabolic panel (BMP + " "Alb, Alk Phos, ALT, AST, Total. Bili, TP)  - lisinopril-hydrochlorothiazide (ZESTORETIC) 10-12.5 MG tablet; Take 1 tablet by mouth daily.    Hyperlipidemia LDL goal <130  - Lipid panel reflex to direct LDL Non-fasting; Future  - Lipid panel reflex to direct LDL Non-fasting    Screen for colon cancer  - Fecal colorectal cancer screen FIT - Future (S+30); Future  - Fecal colorectal cancer screen FIT - Future (S+30)    Morbid obesity (H)  Encouraged ongoing diet and exercise modifications as able.     Moderate episode of recurrent major depressive disorder (H)  Stable on current dose of Risperidone - no refills needed at this time     Screening for prostate cancer  - PSA, screen; Future  - PSA, screen    Other schizophrenia (H)  Stable.     Moderate major depression (H)  Stable.     Elevated glucose  - Hemoglobin A1c; Future  - Hemoglobin A1c    Patient has been advised of split billing requirements and indicates understanding: Yes    BMI  Estimated body mass index is 40.92 kg/m  as calculated from the following:    Height as of this encounter: 1.746 m (5' 8.74\").    Weight as of this encounter: 124.7 kg (275 lb).   Weight management plan: Discussed healthy diet and exercise guidelines    Counseling  Appropriate preventive services were addressed with this patient via screening, questionnaire, or discussion as appropriate for fall prevention, nutrition, physical activity, Tobacco-use cessation, social engagement, weight loss and cognition.  Checklist reviewing preventive services available has been given to the patient.  Reviewed patient's diet, addressing concerns and/or questions.   The patient was instructed to see the dentist every 6 months.   He is at risk for psychosocial distress and has been provided with information to reduce risk.   The patient's PHQ-9 score is consistent with mild depression. He was provided with information regarding depression.       Signed Electronically by: Camila Iglesias, " KENYA    Answers submitted by the patient for this visit:  Patient Health Questionnaire (Submitted on 11/25/2024)  If you checked off any problems, how difficult have these problems made it for you to do your work, take care of things at home, or get along with other people?: Not difficult at all  PHQ9 TOTAL SCORE: 5

## 2024-11-26 RX ORDER — LISINOPRIL AND HYDROCHLOROTHIAZIDE 10; 12.5 MG/1; MG/1
1 TABLET ORAL DAILY
Qty: 60 TABLET | Refills: 0 | Status: SHIPPED | OUTPATIENT
Start: 2024-11-26

## 2024-11-26 NOTE — RESULT ENCOUNTER NOTE
Please call patient with results (after 3PM best). Please let him know overall labs look good. There is no concern for diabetes. Cholesterol levels are just mildly elevated. I would recommend starting a once daily blood pressure medication and this has been sent to his pharmacy. Please schedule a nurse only BP check in 2-3 weeks to recheck this.     Camila Iglesias PA-C

## 2024-11-27 ENCOUNTER — TELEPHONE (OUTPATIENT)
Dept: FAMILY MEDICINE | Facility: CLINIC | Age: 59
End: 2024-11-27
Payer: COMMERCIAL

## 2024-11-27 NOTE — TELEPHONE ENCOUNTER
----- Message from Camila Iglesias sent at 11/26/2024  6:49 AM CST -----  Please call patient with results (after 3PM best). Please let him know overall labs look good. There is no concern for diabetes. Cholesterol levels are just mildly elevated. I would recommend starting a once daily blood pressure medication and this has been sent to his pharmacy. Please schedule a nurse only BP check in 2-3 weeks to recheck this.     Camila Iglesias PA-C

## 2024-12-19 ENCOUNTER — ALLIED HEALTH/NURSE VISIT (OUTPATIENT)
Dept: FAMILY MEDICINE | Facility: CLINIC | Age: 59
End: 2024-12-19
Payer: COMMERCIAL

## 2024-12-19 VITALS — SYSTOLIC BLOOD PRESSURE: 130 MMHG | DIASTOLIC BLOOD PRESSURE: 78 MMHG

## 2024-12-19 DIAGNOSIS — I10 ESSENTIAL HYPERTENSION: Primary | ICD-10-CM

## 2024-12-19 NOTE — PROGRESS NOTES
Krishna Diaz is a 59 year old patient who comes in today for a Blood Pressure check.  Initial BP:  /78      Data Unavailable  Disposition: follow-up as previously indicated by provider and results routed to provider  Geraldine Palm MA 12/19/2024

## 2025-01-03 DIAGNOSIS — G47.09 OTHER INSOMNIA: ICD-10-CM

## 2025-01-05 RX ORDER — ZOLPIDEM TARTRATE 10 MG/1
TABLET ORAL
Qty: 30 TABLET | Refills: 3 | Status: SHIPPED | OUTPATIENT
Start: 2025-01-05

## 2025-01-30 DIAGNOSIS — I10 ESSENTIAL HYPERTENSION: ICD-10-CM

## 2025-01-30 RX ORDER — LISINOPRIL AND HYDROCHLOROTHIAZIDE 10; 12.5 MG/1; MG/1
1 TABLET ORAL DAILY
Qty: 90 TABLET | Refills: 1 | Status: SHIPPED | OUTPATIENT
Start: 2025-01-30

## 2025-07-24 DIAGNOSIS — I10 ESSENTIAL HYPERTENSION: ICD-10-CM

## 2025-07-24 RX ORDER — LISINOPRIL AND HYDROCHLOROTHIAZIDE 10; 12.5 MG/1; MG/1
1 TABLET ORAL DAILY
Qty: 90 TABLET | Refills: 0 | Status: SHIPPED | OUTPATIENT
Start: 2025-07-24